# Patient Record
Sex: FEMALE | Race: WHITE | Employment: FULL TIME | URBAN - METROPOLITAN AREA
[De-identification: names, ages, dates, MRNs, and addresses within clinical notes are randomized per-mention and may not be internally consistent; named-entity substitution may affect disease eponyms.]

---

## 2019-04-30 ENCOUNTER — APPOINTMENT (OUTPATIENT)
Dept: GENERAL RADIOLOGY | Age: 49
DRG: 270 | End: 2019-04-30
Attending: INTERNAL MEDICINE
Payer: COMMERCIAL

## 2019-04-30 ENCOUNTER — APPOINTMENT (OUTPATIENT)
Dept: GENERAL RADIOLOGY | Age: 49
DRG: 270 | End: 2019-04-30
Payer: COMMERCIAL

## 2019-04-30 ENCOUNTER — HOSPITAL ENCOUNTER (INPATIENT)
Dept: CARDIAC CATH/INVASIVE PROCEDURES | Age: 49
LOS: 3 days | Discharge: HOME | DRG: 270 | End: 2019-05-03
Attending: INTERNAL MEDICINE | Admitting: INTERNAL MEDICINE
Payer: COMMERCIAL

## 2019-04-30 DIAGNOSIS — D64.9 ANEMIA, UNSPECIFIED TYPE: Primary | ICD-10-CM

## 2019-04-30 DIAGNOSIS — I21.19 ACUTE INFERIOR MYOCARDIAL INFARCTION (HCC): ICD-10-CM

## 2019-04-30 LAB
HCT VFR BLD CALC: 31.8 % (ref 36–48)
HEMOGLOBIN: 10.4 G/DL (ref 12–16)
LEFT VENTRICULAR EJECTION FRACTION MODE: NORMAL
LV EF: 50 %
MCH RBC QN AUTO: 29.1 PG (ref 26–34)
MCHC RBC AUTO-ENTMCNC: 32.8 G/DL (ref 31–36)
MCV RBC AUTO: 88.6 FL (ref 80–100)
PDW BLD-RTO: 13 % (ref 12.4–15.4)
PLATELET # BLD: 243 K/UL (ref 135–450)
PMV BLD AUTO: 8.6 FL (ref 5–10.5)
RBC # BLD: 3.59 M/UL (ref 4–5.2)
WBC # BLD: 16.2 K/UL (ref 4–11)

## 2019-04-30 PROCEDURE — 99152 MOD SED SAME PHYS/QHP 5/>YRS: CPT

## 2019-04-30 PROCEDURE — 2580000003 HC RX 258

## 2019-04-30 PROCEDURE — 93005 ELECTROCARDIOGRAM TRACING: CPT | Performed by: INTERNAL MEDICINE

## 2019-04-30 PROCEDURE — 6360000002 HC RX W HCPCS: Performed by: INTERNAL MEDICINE

## 2019-04-30 PROCEDURE — 92928 PRQ TCAT PLMT NTRAC ST 1 LES: CPT

## 2019-04-30 PROCEDURE — C1874 STENT, COATED/COV W/DEL SYS: HCPCS

## 2019-04-30 PROCEDURE — 85347 COAGULATION TIME ACTIVATED: CPT

## 2019-04-30 PROCEDURE — C1725 CATH, TRANSLUMIN NON-LASER: HCPCS

## 2019-04-30 PROCEDURE — 2580000003 HC RX 258: Performed by: INTERNAL MEDICINE

## 2019-04-30 PROCEDURE — 2500000003 HC RX 250 WO HCPCS

## 2019-04-30 PROCEDURE — C1887 CATHETER, GUIDING: HCPCS

## 2019-04-30 PROCEDURE — 92941 PRQ TRLML REVSC TOT OCCL AMI: CPT | Performed by: INTERNAL MEDICINE

## 2019-04-30 PROCEDURE — C1769 GUIDE WIRE: HCPCS

## 2019-04-30 PROCEDURE — B2111ZZ FLUOROSCOPY OF MULTIPLE CORONARY ARTERIES USING LOW OSMOLAR CONTRAST: ICD-10-PCS | Performed by: INTERNAL MEDICINE

## 2019-04-30 PROCEDURE — 94762 N-INVAS EAR/PLS OXIMTRY CONT: CPT

## 2019-04-30 PROCEDURE — 027136Z DILATION OF CORONARY ARTERY, TWO ARTERIES WITH THREE DRUG-ELUTING INTRALUMINAL DEVICES, PERCUTANEOUS APPROACH: ICD-10-PCS | Performed by: INTERNAL MEDICINE

## 2019-04-30 PROCEDURE — 33967 INSERT I-AORT PERCUT DEVICE: CPT

## 2019-04-30 PROCEDURE — 6370000000 HC RX 637 (ALT 250 FOR IP)

## 2019-04-30 PROCEDURE — C1894 INTRO/SHEATH, NON-LASER: HCPCS

## 2019-04-30 PROCEDURE — 71045 X-RAY EXAM CHEST 1 VIEW: CPT

## 2019-04-30 PROCEDURE — 5A02210 ASSISTANCE WITH CARDIAC OUTPUT USING BALLOON PUMP, CONTINUOUS: ICD-10-PCS | Performed by: INTERNAL MEDICINE

## 2019-04-30 PROCEDURE — 99153 MOD SED SAME PHYS/QHP EA: CPT

## 2019-04-30 PROCEDURE — 2100000000 HC CCU R&B

## 2019-04-30 PROCEDURE — 99223 1ST HOSP IP/OBS HIGH 75: CPT | Performed by: INTERNAL MEDICINE

## 2019-04-30 PROCEDURE — 6360000004 HC RX CONTRAST MEDICATION: Performed by: INTERNAL MEDICINE

## 2019-04-30 PROCEDURE — 93458 L HRT ARTERY/VENTRICLE ANGIO: CPT

## 2019-04-30 PROCEDURE — 2709999900 HC NON-CHARGEABLE SUPPLY

## 2019-04-30 PROCEDURE — 92928 PRQ TCAT PLMT NTRAC ST 1 LES: CPT | Performed by: INTERNAL MEDICINE

## 2019-04-30 PROCEDURE — 6360000002 HC RX W HCPCS

## 2019-04-30 PROCEDURE — 85027 COMPLETE CBC AUTOMATED: CPT

## 2019-04-30 PROCEDURE — 2700000000 HC OXYGEN THERAPY PER DAY

## 2019-04-30 PROCEDURE — 36415 COLL VENOUS BLD VENIPUNCTURE: CPT

## 2019-04-30 PROCEDURE — C9113 INJ PANTOPRAZOLE SODIUM, VIA: HCPCS | Performed by: INTERNAL MEDICINE

## 2019-04-30 PROCEDURE — 2780000010 HC IMPLANT OTHER

## 2019-04-30 RX ORDER — ATROPINE SULFATE 0.4 MG/ML
0.5 AMPUL (ML) INJECTION
Status: ACTIVE | OUTPATIENT
Start: 2019-04-30 | End: 2019-04-30

## 2019-04-30 RX ORDER — SODIUM CHLORIDE 9 MG/ML
INJECTION, SOLUTION INTRAVENOUS CONTINUOUS
Status: DISCONTINUED | OUTPATIENT
Start: 2019-04-30 | End: 2019-05-03 | Stop reason: HOSPADM

## 2019-04-30 RX ORDER — PROMETHAZINE HYDROCHLORIDE 25 MG/ML
12.5 INJECTION, SOLUTION INTRAMUSCULAR; INTRAVENOUS ONCE
Status: DISCONTINUED | OUTPATIENT
Start: 2019-04-30 | End: 2019-04-30

## 2019-04-30 RX ORDER — LORAZEPAM 2 MG/ML
INJECTION INTRAMUSCULAR
Status: DISPENSED
Start: 2019-04-30 | End: 2019-05-01

## 2019-04-30 RX ORDER — ACETAMINOPHEN 325 MG/1
650 TABLET ORAL EVERY 4 HOURS PRN
Status: DISCONTINUED | OUTPATIENT
Start: 2019-04-30 | End: 2019-05-03 | Stop reason: HOSPADM

## 2019-04-30 RX ORDER — ASPIRIN 81 MG/1
81 TABLET ORAL DAILY
Status: DISCONTINUED | OUTPATIENT
Start: 2019-05-01 | End: 2019-05-03 | Stop reason: HOSPADM

## 2019-04-30 RX ORDER — DOPAMINE HYDROCHLORIDE 160 MG/100ML
5 INJECTION, SOLUTION INTRAVENOUS CONTINUOUS
Status: DISCONTINUED | OUTPATIENT
Start: 2019-04-30 | End: 2019-05-03 | Stop reason: HOSPADM

## 2019-04-30 RX ORDER — CLOBETASOL PROPIONATE 0.5 MG/G
CREAM TOPICAL 2 TIMES DAILY
COMMUNITY
End: 2019-05-17 | Stop reason: CLARIF

## 2019-04-30 RX ORDER — BISACODYL 10 MG
10 SUPPOSITORY, RECTAL RECTAL DAILY PRN
Status: DISCONTINUED | OUTPATIENT
Start: 2019-04-30 | End: 2019-05-03 | Stop reason: HOSPADM

## 2019-04-30 RX ORDER — DIPHENHYDRAMINE HYDROCHLORIDE 50 MG/ML
25 INJECTION INTRAMUSCULAR; INTRAVENOUS ONCE
Status: COMPLETED | OUTPATIENT
Start: 2019-04-30 | End: 2019-04-30

## 2019-04-30 RX ORDER — PANTOPRAZOLE SODIUM 40 MG/10ML
40 INJECTION, POWDER, LYOPHILIZED, FOR SOLUTION INTRAVENOUS DAILY
Status: DISCONTINUED | OUTPATIENT
Start: 2019-04-30 | End: 2019-05-03 | Stop reason: HOSPADM

## 2019-04-30 RX ORDER — 0.9 % SODIUM CHLORIDE 0.9 %
10 VIAL (ML) INJECTION DAILY
Status: DISCONTINUED | OUTPATIENT
Start: 2019-04-30 | End: 2019-05-03 | Stop reason: HOSPADM

## 2019-04-30 RX ORDER — 0.9 % SODIUM CHLORIDE 0.9 %
250 INTRAVENOUS SOLUTION INTRAVENOUS PRN
Status: DISCONTINUED | OUTPATIENT
Start: 2019-04-30 | End: 2019-05-03 | Stop reason: HOSPADM

## 2019-04-30 RX ORDER — LIDOCAINE HYDROCHLORIDE 10 MG/ML
5 INJECTION, SOLUTION EPIDURAL; INFILTRATION; INTRACAUDAL; PERINEURAL ONCE
Status: DISCONTINUED | OUTPATIENT
Start: 2019-04-30 | End: 2019-05-03 | Stop reason: HOSPADM

## 2019-04-30 RX ORDER — DIPHENHYDRAMINE HYDROCHLORIDE 50 MG/ML
25 INJECTION INTRAMUSCULAR; INTRAVENOUS EVERY 6 HOURS PRN
Status: DISCONTINUED | OUTPATIENT
Start: 2019-04-30 | End: 2019-05-03 | Stop reason: HOSPADM

## 2019-04-30 RX ORDER — ONDANSETRON 2 MG/ML
4 INJECTION INTRAMUSCULAR; INTRAVENOUS EVERY 6 HOURS PRN
Status: DISCONTINUED | OUTPATIENT
Start: 2019-04-30 | End: 2019-05-03 | Stop reason: HOSPADM

## 2019-04-30 RX ORDER — MORPHINE SULFATE 2 MG/ML
2 INJECTION, SOLUTION INTRAMUSCULAR; INTRAVENOUS
Status: ACTIVE | OUTPATIENT
Start: 2019-04-30 | End: 2019-04-30

## 2019-04-30 RX ORDER — SODIUM CHLORIDE 0.9 % (FLUSH) 0.9 %
10 SYRINGE (ML) INJECTION EVERY 12 HOURS SCHEDULED
Status: DISCONTINUED | OUTPATIENT
Start: 2019-04-30 | End: 2019-05-03 | Stop reason: HOSPADM

## 2019-04-30 RX ORDER — ATORVASTATIN CALCIUM 40 MG/1
40 TABLET, FILM COATED ORAL NIGHTLY
Status: DISCONTINUED | OUTPATIENT
Start: 2019-04-30 | End: 2019-05-03 | Stop reason: HOSPADM

## 2019-04-30 RX ORDER — SODIUM CHLORIDE 0.9 % (FLUSH) 0.9 %
10 SYRINGE (ML) INJECTION EVERY 12 HOURS SCHEDULED
Status: DISCONTINUED | OUTPATIENT
Start: 2019-04-30 | End: 2019-04-30

## 2019-04-30 RX ORDER — LISINOPRIL 10 MG/1
10 TABLET ORAL DAILY
Status: ON HOLD | COMMUNITY
End: 2019-05-01 | Stop reason: ALTCHOICE

## 2019-04-30 RX ORDER — SODIUM CHLORIDE 0.9 % (FLUSH) 0.9 %
10 SYRINGE (ML) INJECTION PRN
Status: DISCONTINUED | OUTPATIENT
Start: 2019-04-30 | End: 2019-04-30

## 2019-04-30 RX ORDER — LORAZEPAM 2 MG/ML
0.5 INJECTION INTRAMUSCULAR ONCE
Status: COMPLETED | OUTPATIENT
Start: 2019-04-30 | End: 2019-04-30

## 2019-04-30 RX ORDER — PAROXETINE 10 MG/1
10 TABLET, FILM COATED ORAL EVERY MORNING
Status: ON HOLD | COMMUNITY
End: 2019-05-01

## 2019-04-30 RX ORDER — SODIUM CHLORIDE 0.9 % (FLUSH) 0.9 %
10 SYRINGE (ML) INJECTION PRN
Status: DISCONTINUED | OUTPATIENT
Start: 2019-04-30 | End: 2019-05-03 | Stop reason: HOSPADM

## 2019-04-30 RX ADMIN — DIPHENHYDRAMINE HYDROCHLORIDE 25 MG: 50 INJECTION, SOLUTION INTRAMUSCULAR; INTRAVENOUS at 16:28

## 2019-04-30 RX ADMIN — LORAZEPAM 0.5 MG: 2 INJECTION INTRAMUSCULAR; INTRAVENOUS at 19:19

## 2019-04-30 RX ADMIN — Medication 10 ML: at 16:28

## 2019-04-30 RX ADMIN — PANTOPRAZOLE SODIUM 40 MG: 40 INJECTION, POWDER, FOR SOLUTION INTRAVENOUS at 16:28

## 2019-04-30 RX ADMIN — ONDANSETRON 4 MG: 2 INJECTION INTRAMUSCULAR; INTRAVENOUS at 18:11

## 2019-04-30 RX ADMIN — PROMETHAZINE HYDROCHLORIDE 12.5 MG: 25 INJECTION INTRAMUSCULAR; INTRAVENOUS at 14:25

## 2019-04-30 RX ADMIN — TIROFIBAN 0.15 MCG/KG/MIN: 5 INJECTION, SOLUTION INTRAVENOUS at 23:58

## 2019-04-30 RX ADMIN — IOPAMIDOL 140 ML: 755 INJECTION, SOLUTION INTRAVENOUS at 13:24

## 2019-04-30 RX ADMIN — DIPHENHYDRAMINE HYDROCHLORIDE 25 MG: 50 INJECTION, SOLUTION INTRAMUSCULAR; INTRAVENOUS at 23:25

## 2019-04-30 SDOH — HEALTH STABILITY: MENTAL HEALTH: HOW OFTEN DO YOU HAVE A DRINK CONTAINING ALCOHOL?: MONTHLY OR LESS

## 2019-04-30 ASSESSMENT — PAIN SCALES - GENERAL
PAINLEVEL_OUTOF10: 0

## 2019-04-30 NOTE — PROGRESS NOTES
1910 Shift report received from [facility]. Patient resting on bed in RT position. Drowsy but awakens easily. IABP functioning appropriately. Handoff completed on Dopamine & Aggrastat gtt's. MAP currently >goal. Parry draining clear urine. New orders placed; hold Brilinta tonight d/t nausea, keep Aggrastat running overnight per Dr Asha Gilmore. 1915 STAT EKG performed. 2000 Shift assessment completed. VSS, afebrile, MAP >goal.    2055 MAP well above goal, dopamine titrated down per MAR.    2200 Patient chest pain & nausea free at this time. VSS stable on dopamine. IABP functioning appropriately. 0000 Reassessment completed. VSS, afebrile, MAP >goal. Patient having lower back pain (chronic) and unable to get in a comfortable despite trying multiple positions using turning wedge & pillows. Uses heat pad at home. Cardiology page for orders. [facility] re-paged. 56 Spoke with Dr Jeanine Bernal; ok to apply heat therapy to lower back. 0115 Heat providing relief to back pain. Pt feeling nauseous; phenergan IVPB initiated. 0200 Nausea settled. Patient resting quietly. 0300 Patient states she is having an anxiety attack. When questioned about what writer can do to help her patient states \"nothing, I just need to relax\". When asked if she tales anything for anxiety at home the patient said \"no\". 0400 Shift assessment completed. VSS, afebrile, MAP >goal. IABP functioning appropriately. 0500 Lab at bedside to collect morning blood work.    0600 Critical value; troponin 1.02. Call to Cardiology deferred as result anticipated due to yesterdays cardiac event.    0705 Shift report given to [facility].

## 2019-04-30 NOTE — PROCEDURES
coronary wire and the lesion ballooned with a 2.5-mm  balloon. This did result in DALTON 3 flow in the vessel. The patient was  given IV unfractionated heparin. ACTs were checked throughout the case  to maintain an ACT greater than 250 seconds. She was also started on IV  tirofiban for antiplatelet activity. The patient became unstable at this point with hypotension. She was  given 1 mg of IV epinephrine as her pressure was in the 40s at one  point. She did have some short runs of VT with this and her systolic  pressure augmented greatly. Eventually, her pressure settled down and  she was placed on an IV dopamine drip at 10 mcg/kg/minute. The RCA lesion was then stented with a 3 mm x 23-mm [place] drug-eluting  stent dilated to 3.10 mm. There was residual stenosis off the distal  end so this was then stented with a 2.75 x 12-mm [place] drug-eluting  stent dilated to a normal diameter. The overlap between the stents was  dilated. The balloon was then removed and final angiography was  performed with the wire down and removed. The guide catheter was  removed over the J-wire. Next, the pigtail catheter was advanced over  the wire into the left ventricle. Left ventricular end-diastolic  pressure measurements were obtained and then a power injection left  ventriculogram was performed. Catheter was flushed and placed back on  pressure and then pulled back across the aortic valve to assess for  gradient. Catheter was removed over the wire. Next, my attention was  turned towards performing an intervention on the 99% proximal circumflex  lesion. Over the 0.035 J-wire, the XB3.5, 6-[place]ian guide catheter was advanced  to the ostium of the left main coronary artery. The 99% circumflex  lesion was crossed with a Whisper 0.014 coronary wire. The proximal RCA  was then ballooned with a 2.5-mm balloon. This was then stented with a  2.75 x 12-mm [place] drug-eluting stent.   This was dilated to a normal  diameter and repeat angiography was performed after removal of the  balloon. There was DALTON 3 flow in the vessel and 0% residual stenosis. The wire was removed and final angiography was performed. Guide  catheter was removed over the wire. The dopamine was titrated down to approximately 5 mcg/kg/minute. Angiography of the access site in the right femoral artery was performed  through the sheath port. The patient did become somewhat unstable with  regard to her blood pressure despite the lower dose of dopamine and IV  fluids with a systolic pressure in the 21N. The determination was made  to place an intraaortic balloon pump. The 6-Burmese sheath was exchanged out for the balloon pump introducer  sheath. The balloon pump was then introduced over the 0.025 wire into  the descending aorta just distal to the left subclavian artery. The  balloon pump system was flushed and then placed on pressure. It was  then turned on. At 1:1 counterpulsation, this demonstrated good  augmentation of diastolic pressure and the mean arterial pressure. Sterile dressing was applied and the patient was taken off the table. There were no complications from the procedure and there was minimal  blood loss. Moderate sedation was performed for the procedure. Total  duration of sedation was 86 minutes. The patient received a total of 2  mg of IV Versed and no fentanyl. She received Zofran and Viktor-Synephrine  along with one dose of epinephrine and a dopamine drip. FINDINGS:  1. Right-dominant coronary arterial circulation with 100% occlusion of  the proximal RCA. This was covered with overlapping stents. The more  proximal stent was a 3 mm x 23-mm [place] drug-eluting stent dilated to  3.1. Distal to this and overlapping was a 2.75 x 12-mm [place]  drug-eluting stent. In the left main, there was no significant disease.   The circumflex had a 99% proximal lesion that was intervened upon with a  2.75 x 12-mm [address] drug-eluting stent dilated to 2.75 mm. In the left  anterior descending artery, there is a focal 60% lesion present with no  flow limitation. 2.  Preserved left ventricular systolic function. LV ejection fraction  50% but basal to mid-inferior wall hypokinesis. 3.  Left ventricular end-diastolic pressure 11 to 13 mmHg. 4.  No gradient across the aortic valve on pullback to suggest aortic  stenosis.         Hilario Lemons MD    D: 04/30/2019 13:22:22       T: 04/30/2019 13:27:08     [email or web address]  Job#: [id number]     Doc#: [id number]    CC:

## 2019-04-30 NOTE — H&P
[facility]  1970 April 30, 2019      CC: Chest Pain    HPI:  The patient is 46 y.o. female with a past medical history significant for prior tobacco abuse who presented to [facility] with chest pain that had been occurring for about a week off an on. She had presented several days ago to an ED in [place], [place] and had a chest CTA demonstrating no PE or aneurysm/dissection. Today, her chest pain came on and did not subside. She had tingling and numbness into her left arm. ON presetaton to [facility] she had T wave inversion inferolaterally and elevated troponin assays consistent with a NSTEMI. Given that she had persistent chest pain, She was flown urgently to [facility] for coronary angiography. On arrival, she rates her chest pain a 7 out of 10 in severity,. Review of Systems:  Constitutional: No fatigue, weakness, night sweats or fever. HEENT: No new vision difficulties or ringing in the ears. Respiratory: No new SOB, PND, orthopnea or cough. Cardiovascular: See HPI   GI: No n/v, diarrhea, constipation, abdominal pain or changes in bowel habits. No melena, no hematochezia  : No urinary frequency, urgency, incontinence, hematuria or dysuria. Skin: No cyanosis or skin lesions. Musculoskeletal: No new muscle or joint pain. Neurological: No syncope or TIA-like symptoms.   Psychiatric: No anxiety, insomnia or depression     Past medical history:  Hypertension  Hyperlipidemia  Depression    Family history:  Reviewed and noncontributory    Social History     Tobacco Use    Smoking status: Former   Substance Use Topics    Alcohol use: Denies excessive    Drug use: Denies excessive       Allergies:  Penicillin    Medications:  Reviewed:  Lisinopril  Atorvastatin  Paroxetine      Physical Exam:   119/73  74  14  95% on 2 liters by NC  Wt Readings from Last 2 Encounters:   No data found for Wt     Constitutional: She is oriented to person,

## 2019-04-30 NOTE — ANESTHESIA PRE-OP
Brief Pre-Op Note/Sedation Assessment      Luh Mcfadden  1970  Room/bed info not found  [id number]  11:39 AM    Planned Procedure: Cardiac Catheterization Procedure    Post Procedure Plan: Return to same level of care    Consent: I have discussed with the patient and/or the patient representative the indication, alternatives, and the possible risks and/or complications of the planned procedure and the anesthesia methods. The patient and/or patient representative appear to understand and agree to proceed. Chief Complaint: NSTEMI      Indications for the Procedure:   CAD Presentation:  ACS > 24 hrs  Anginal Classification within 2 weeks:  CCS IV - Inability to perform any activity without angina or angina at rest, i.e., severe limitation  NYHA Heart Failure Class within 2 weeks: No symptoms      Anti- Anginal Meds within 2 weeks:   ANTI-ANGINAL MEDS: Yes: Aspirin      Stress or Imaging Studies Performed:  None    Vital Signs: There were no vitals taken for this visit. Allergies: Allergies not on file    Past Medical History:  No past medical history on file. Surgical History:  No past surgical history on file. Medications:  No current outpatient medications on file. No current facility-administered medications for this encounter. Pre-Sedation:    Pre-Sedation Documentation and Exam:  I have personally completed a history, physical exam & review of systems for this patient (see notes). Prior History of Anesthesia Complications:   none    Modified Mallampati:  I (soft palate, uvula, fauces, tonsillar pillars visible)    ASA Classification:  Class 2 - A normal healthy patient with mild systemic disease and Class 2 -- A normal healthy patient with mild systemic disease    Bret Scale:   Activity:  2 - Able to move 4 extremities voluntarily on command  Respiration:  2 - Able to breathe deeply and cough freely  Circulation:  2 - BP+/- 20mmHg of normal  Consciousness:  2 - Fully awake  Oxygen Saturation (color):  2 - Able to maintain oxygen saturation >92% on room air    Sedation/Anesthesia Plan:  Guard the patient's safety and welfare. Minimize physical discomfort and pain. Minimize negative psychological responses to treatment by providing sedation and analgesia and maximize the potential amnesia. Patient to meet pre-procedure discharge plan.     Medication Planned:  midazolam intravenously, fentanyl intravenously and midazolam intravenously, fentanyl intravenously    Patient is an appropriate candidate for plan of sedation: yes      Electronically signed by Parul Shelton MD on 4/30/2019 at 11:39 AM

## 2019-04-30 NOTE — PROGRESS NOTES
line in AM. Will obtain EKG, perhaps consult GI in AM if nausea does not subside. Discussed continued need for dopamine and inability to wean further. Patient and  updated. 2230- Report given to Camille Qureshi RN. Bedside handoff completed.

## 2019-05-01 ENCOUNTER — APPOINTMENT (OUTPATIENT)
Dept: GENERAL RADIOLOGY | Age: 49
DRG: 270 | End: 2019-05-01
Attending: INTERNAL MEDICINE
Payer: COMMERCIAL

## 2019-05-01 LAB
A/G RATIO: 1.1 (ref 1.1–2.2)
ALBUMIN SERPL-MCNC: 2.7 G/DL (ref 3.4–5)
ALP BLD-CCNC: 97 U/L (ref 40–129)
ALT SERPL-CCNC: 12 U/L (ref 10–40)
ANION GAP SERPL CALCULATED.3IONS-SCNC: 9 MMOL/L (ref 3–16)
AST SERPL-CCNC: 56 U/L (ref 15–37)
BILIRUB SERPL-MCNC: <0.2 MG/DL (ref 0–1)
BUN BLDV-MCNC: 12 MG/DL (ref 7–20)
CALCIUM SERPL-MCNC: 7.3 MG/DL (ref 8.3–10.6)
CHLORIDE BLD-SCNC: 109 MMOL/L (ref 99–110)
CHOLESTEROL, TOTAL: 157 MG/DL (ref 0–199)
CO2: 21 MMOL/L (ref 21–32)
CREAT SERPL-MCNC: 0.9 MG/DL (ref 0.6–1.1)
EKG ATRIAL RATE: 69 BPM
EKG ATRIAL RATE: 72 BPM
EKG DIAGNOSIS: NORMAL
EKG DIAGNOSIS: NORMAL
EKG P AXIS: 70 DEGREES
EKG P AXIS: 76 DEGREES
EKG P-R INTERVAL: 164 MS
EKG P-R INTERVAL: 168 MS
EKG Q-T INTERVAL: 420 MS
EKG Q-T INTERVAL: 462 MS
EKG QRS DURATION: 104 MS
EKG QRS DURATION: 78 MS
EKG QTC CALCULATION (BAZETT): 459 MS
EKG QTC CALCULATION (BAZETT): 495 MS
EKG R AXIS: -7 DEGREES
EKG R AXIS: 6 DEGREES
EKG T AXIS: -34 DEGREES
EKG T AXIS: 4 DEGREES
EKG VENTRICULAR RATE: 69 BPM
EKG VENTRICULAR RATE: 72 BPM
GFR AFRICAN AMERICAN: >60
GFR NON-AFRICAN AMERICAN: >60
GLOBULIN: 2.5 G/DL
GLUCOSE BLD-MCNC: 112 MG/DL (ref 70–99)
HCT VFR BLD CALC: 26.7 % (ref 36–48)
HCT VFR BLD CALC: 29.2 % (ref 36–48)
HDLC SERPL-MCNC: 57 MG/DL (ref 40–60)
HEMOGLOBIN: 9 G/DL (ref 12–16)
HEMOGLOBIN: 9.7 G/DL (ref 12–16)
LDL CHOLESTEROL CALCULATED: 68 MG/DL
LV EF: 63 %
LVEF MODALITY: NORMAL
MCH RBC QN AUTO: 29 PG (ref 26–34)
MCH RBC QN AUTO: 29.2 PG (ref 26–34)
MCHC RBC AUTO-ENTMCNC: 33 G/DL (ref 31–36)
MCHC RBC AUTO-ENTMCNC: 33.5 G/DL (ref 31–36)
MCV RBC AUTO: 87.2 FL (ref 80–100)
MCV RBC AUTO: 87.9 FL (ref 80–100)
PDW BLD-RTO: 12.9 % (ref 12.4–15.4)
PDW BLD-RTO: 13.1 % (ref 12.4–15.4)
PLATELET # BLD: 205 K/UL (ref 135–450)
PLATELET # BLD: 221 K/UL (ref 135–450)
PLATELET # BLD: 229 K/UL (ref 135–450)
PMV BLD AUTO: 8.3 FL (ref 5–10.5)
PMV BLD AUTO: 8.4 FL (ref 5–10.5)
POTASSIUM SERPL-SCNC: 3.5 MMOL/L (ref 3.5–5.1)
RBC # BLD: 3.07 M/UL (ref 4–5.2)
RBC # BLD: 3.33 M/UL (ref 4–5.2)
SODIUM BLD-SCNC: 139 MMOL/L (ref 136–145)
TOTAL PROTEIN: 5.2 G/DL (ref 6.4–8.2)
TRIGL SERPL-MCNC: 158 MG/DL (ref 0–150)
TROPONIN: 1.02 NG/ML
VLDLC SERPL CALC-MCNC: 32 MG/DL
WBC # BLD: 11.2 K/UL (ref 4–11)
WBC # BLD: 13.1 K/UL (ref 4–11)

## 2019-05-01 PROCEDURE — 99233 SBSQ HOSP IP/OBS HIGH 50: CPT | Performed by: INTERNAL MEDICINE

## 2019-05-01 PROCEDURE — 85027 COMPLETE CBC AUTOMATED: CPT

## 2019-05-01 PROCEDURE — 93010 ELECTROCARDIOGRAM REPORT: CPT | Performed by: INTERNAL MEDICINE

## 2019-05-01 PROCEDURE — 71045 X-RAY EXAM CHEST 1 VIEW: CPT

## 2019-05-01 PROCEDURE — 94761 N-INVAS EAR/PLS OXIMETRY MLT: CPT

## 2019-05-01 PROCEDURE — 33968 REMOVE AORTIC ASSIST DEVICE: CPT | Performed by: INTERNAL MEDICINE

## 2019-05-01 PROCEDURE — 6370000000 HC RX 637 (ALT 250 FOR IP): Performed by: INTERNAL MEDICINE

## 2019-05-01 PROCEDURE — 80053 COMPREHEN METABOLIC PANEL: CPT

## 2019-05-01 PROCEDURE — C1751 CATH, INF, PER/CENT/MIDLINE: HCPCS

## 2019-05-01 PROCEDURE — 36415 COLL VENOUS BLD VENIPUNCTURE: CPT

## 2019-05-01 PROCEDURE — 6360000002 HC RX W HCPCS: Performed by: INTERNAL MEDICINE

## 2019-05-01 PROCEDURE — 36569 INSJ PICC 5 YR+ W/O IMAGING: CPT

## 2019-05-01 PROCEDURE — 76937 US GUIDE VASCULAR ACCESS: CPT

## 2019-05-01 PROCEDURE — 02HV33Z INSERTION OF INFUSION DEVICE INTO SUPERIOR VENA CAVA, PERCUTANEOUS APPROACH: ICD-10-PCS | Performed by: RADIOLOGY

## 2019-05-01 PROCEDURE — 84484 ASSAY OF TROPONIN QUANT: CPT

## 2019-05-01 PROCEDURE — 85049 AUTOMATED PLATELET COUNT: CPT

## 2019-05-01 PROCEDURE — 2580000003 HC RX 258: Performed by: INTERNAL MEDICINE

## 2019-05-01 PROCEDURE — C9113 INJ PANTOPRAZOLE SODIUM, VIA: HCPCS | Performed by: INTERNAL MEDICINE

## 2019-05-01 PROCEDURE — 6360000002 HC RX W HCPCS

## 2019-05-01 PROCEDURE — 93306 TTE W/DOPPLER COMPLETE: CPT

## 2019-05-01 PROCEDURE — 2100000000 HC CCU R&B

## 2019-05-01 PROCEDURE — 80061 LIPID PANEL: CPT

## 2019-05-01 RX ORDER — ATORVASTATIN CALCIUM 20 MG/1
20 TABLET, FILM COATED ORAL DAILY
Status: ON HOLD | COMMUNITY
End: 2019-05-03 | Stop reason: HOSPADM

## 2019-05-01 RX ORDER — METOPROLOL SUCCINATE 25 MG/1
12.5 TABLET, EXTENDED RELEASE ORAL EVERY EVENING
Status: DISCONTINUED | OUTPATIENT
Start: 2019-05-01 | End: 2019-05-03 | Stop reason: HOSPADM

## 2019-05-01 RX ORDER — ASPIRIN 325 MG
325 TABLET ORAL DAILY
Status: ON HOLD | COMMUNITY
End: 2019-05-03 | Stop reason: HOSPADM

## 2019-05-01 RX ORDER — ATROPINE SULFATE 0.1 MG/ML
INJECTION INTRAVENOUS
Status: DISCONTINUED
Start: 2019-05-01 | End: 2019-05-01 | Stop reason: WASHOUT

## 2019-05-01 RX ORDER — M-VIT,TX,IRON,MINS/CALC/FOLIC 27MG-0.4MG
1 TABLET ORAL DAILY
Status: ON HOLD | COMMUNITY
End: 2019-05-03 | Stop reason: HOSPADM

## 2019-05-01 RX ORDER — FENTANYL CITRATE 50 UG/ML
25 INJECTION, SOLUTION INTRAMUSCULAR; INTRAVENOUS ONCE
Status: COMPLETED | OUTPATIENT
Start: 2019-05-01 | End: 2019-05-01

## 2019-05-01 RX ORDER — POTASSIUM CHLORIDE 29.8 MG/ML
20 INJECTION INTRAVENOUS ONCE
Status: COMPLETED | OUTPATIENT
Start: 2019-05-01 | End: 2019-05-01

## 2019-05-01 RX ADMIN — TICAGRELOR 180 MG: 90 TABLET ORAL at 09:40

## 2019-05-01 RX ADMIN — Medication 12.5 MG: at 01:18

## 2019-05-01 RX ADMIN — TICAGRELOR 90 MG: 90 TABLET ORAL at 20:56

## 2019-05-01 RX ADMIN — ASPIRIN 81 MG: 81 TABLET, COATED ORAL at 09:40

## 2019-05-01 RX ADMIN — FENTANYL CITRATE 25 MCG: 50 INJECTION, SOLUTION INTRAMUSCULAR; INTRAVENOUS at 11:23

## 2019-05-01 RX ADMIN — DOPAMINE HYDROCHLORIDE 3 MCG/KG/MIN: 160 INJECTION, SOLUTION INTRAVENOUS at 03:36

## 2019-05-01 RX ADMIN — ACETAMINOPHEN 650 MG: 325 TABLET ORAL at 07:45

## 2019-05-01 RX ADMIN — SODIUM CHLORIDE: 9 INJECTION, SOLUTION INTRAVENOUS at 04:53

## 2019-05-01 RX ADMIN — SODIUM CHLORIDE: 9 INJECTION, SOLUTION INTRAVENOUS at 18:29

## 2019-05-01 RX ADMIN — METOPROLOL SUCCINATE 12.5 MG: 25 TABLET, EXTENDED RELEASE ORAL at 17:33

## 2019-05-01 RX ADMIN — FENTANYL CITRATE 25 MCG: 50 INJECTION INTRAMUSCULAR; INTRAVENOUS at 11:40

## 2019-05-01 RX ADMIN — ACETAMINOPHEN 650 MG: 325 TABLET ORAL at 12:24

## 2019-05-01 RX ADMIN — ATORVASTATIN CALCIUM 40 MG: 40 TABLET, FILM COATED ORAL at 20:56

## 2019-05-01 RX ADMIN — Medication 10 ML: at 20:58

## 2019-05-01 RX ADMIN — PANTOPRAZOLE SODIUM 40 MG: 40 INJECTION, POWDER, FOR SOLUTION INTRAVENOUS at 09:40

## 2019-05-01 RX ADMIN — Medication 10 ML: at 09:41

## 2019-05-01 RX ADMIN — POTASSIUM CHLORIDE 20 MEQ: 29.8 INJECTION, SOLUTION INTRAVENOUS at 11:25

## 2019-05-01 ASSESSMENT — PAIN DESCRIPTION - PROGRESSION: CLINICAL_PROGRESSION: NOT CHANGED

## 2019-05-01 ASSESSMENT — PAIN DESCRIPTION - LOCATION
LOCATION: BACK

## 2019-05-01 ASSESSMENT — PAIN DESCRIPTION - PAIN TYPE
TYPE: CHRONIC PAIN

## 2019-05-01 ASSESSMENT — PAIN DESCRIPTION - FREQUENCY
FREQUENCY: CONTINUOUS
FREQUENCY: CONTINUOUS

## 2019-05-01 ASSESSMENT — PAIN DESCRIPTION - ORIENTATION
ORIENTATION: LOWER

## 2019-05-01 ASSESSMENT — PAIN SCALES - GENERAL
PAINLEVEL_OUTOF10: 6
PAINLEVEL_OUTOF10: 6
PAINLEVEL_OUTOF10: 0
PAINLEVEL_OUTOF10: 4
PAINLEVEL_OUTOF10: 0
PAINLEVEL_OUTOF10: 4
PAINLEVEL_OUTOF10: 6
PAINLEVEL_OUTOF10: 2
PAINLEVEL_OUTOF10: 4
PAINLEVEL_OUTOF10: 5

## 2019-05-01 ASSESSMENT — PAIN DESCRIPTION - DESCRIPTORS
DESCRIPTORS: ACHING;DISCOMFORT
DESCRIPTORS: ACHING;DISCOMFORT

## 2019-05-01 ASSESSMENT — PAIN - FUNCTIONAL ASSESSMENT: PAIN_FUNCTIONAL_ASSESSMENT: ACTIVITIES ARE NOT PREVENTED

## 2019-05-01 ASSESSMENT — PAIN DESCRIPTION - ONSET: ONSET: GRADUAL

## 2019-05-01 NOTE — PROGRESS NOTES
[facility]   Daily Progress Note      Admit Date:  4/30/2019      Subjective:   Ms. Ewing is a 46 y.o. female with a past medical history significant for prior tobacco abuse who presented to [facility] with chest pain that had been occurring for about a week off an on. She had presented several days ago to an ED in [place], [place] and had a chest CTA demonstrating no PE or aneurysm/dissection. Today, her chest pain came on and did not subside. She had tingling and numbness into her left arm. ON presetaton to [facility] she had T wave inversion inferolaterally and elevated troponin assays consistent with a NSTEMI. Given that she had persistent chest pain, She was flown urgently to [facility] for coronary angiography. In the lab she underwent PCI with MELBA X2 to her 100% occluded proximal RCA restoring DALTON 3 flow. She also underwent MELBA placement to a 99% proximal Circumflex lesion resulting in 0% residual stenosis. Micheal Aguilar feels better today. She has no nausea or chest pain.  Brief runs of NSVT on telemetry.        Objective:     /82   Pulse 90   Temp 98.4 °F (36.9 °C) (Temporal)   Resp 22   Ht 5' 7\" (1.702 m)   Wt 169 lb 8.5 oz (76.9 kg)   SpO2 93%   BMI 26.55 kg/m²      Intake/Output Summary (Last 24 hours) at 5/1/2019 1159  Last data filed at 5/1/2019 1000  Gross per 24 hour   Intake 2966.75 ml   Output 1080 ml   Net 1886.75 ml       Physical Exam:  General:  Awake, alert, NAD  Skin:  Warm and dry  Neck:  JVD<8, no carotid bruits  Chest:  Clear to auscultation, no wheezes/rhonchi/rales  Cardiovascular:  RRR, normal S1/S2, no M/R/G  Abdomen:  Soft, nontender, +bowel sounds  Extremities:  No edema  Pulses: 2+ bilat carotid    2+ bilat radial    2+ bilat femoral        Medications:    atropine        sodium chloride flush  10 mL Intravenous 2 times per day    atorvastatin  40 mg Oral Nightly    aspirin  81 mg Oral Daily    ticagrelor  90 mg Oral BID    pantoprazole  40 mg Intravenous Daily    And    sodium chloride (PF)  10 mL Intravenous Daily    lidocaine 1 % injection  5 mL Intradermal Once      tirofiban 0.15 mcg/kg/min (04/30/19 9114)    DOPamine Stopped (05/01/19 0750)    sodium chloride 100 mL/hr at 05/01/19 1052       Lab Data:  CBC:   Recent Labs     04/30/19 2009 05/01/19  0507   WBC 16.2* 11.2*   HGB 10.4* 9.0*    229  221     BMP:    Recent Labs     05/01/19  0507      K 3.5   CO2 21   BUN 12   CREATININE 0.9     LIVR:   Recent Labs     05/01/19  0507   AST 56*   ALT 12     PT/INR:   Recent Labs     05/01/19  0507   PROT 5.2*     APTT: No results for input(s): APTT in the last 72 hours. BNP:  No results for input(s): BNP in the last 72 hours. CARDIAC ENZYMES:  Recent Labs     05/01/19 0507   TROPONINI 1.02*     FASTING LIPID PANEL:  Lab Results   Component Value Date    CHOL 157 05/01/2019    HDL 57 05/01/2019    TRIG 158 05/01/2019       Assessment:    Patient Active Problem List   Diagnosis    Acute inferior myocardial infarction [facility])    Cardiogenic shock (HCC)        Nausea/Vomiting    Plan:   Antonieta Hernandez is doing better today and now off vasopressors. I removed her IABP this morning. We will monitro her urine output to see if this improves. Holding beta blocker today as well as ACEI/ARB therapy until we are certain her BP will be stable. May be able to introduce low dose beta blockade this evening or tomorrow morning. Reloaded with Brilinta this am. Will stop Aggrastat now. Nausea has resolved. This may have post infarct related or vagal stimulation from IABP site.            Signed:  Radha Borjas MD

## 2019-05-01 NOTE — PROGRESS NOTES
PICC retracted 4cm per recommendation of Dr Angie Ge and repeat [facility] done. PICC now in low SVC per Dr Angie Ge. Nurse notified that PICC in proper placement and is OK to use.

## 2019-05-01 NOTE — CONSULTS
[address] [facility]  CARDIOPULMONARY PHASE I CONSULT        NAME:  Cindy Warren RECORD NUMBER:  [id number]  AGE: 46 y.o. GENDER: female  : 1970  TODAY'S DATE:  2019    Subjective:     VISIT TYPE: evaluation     ADMITTING PHYSICIAN:  Paco Phillips MD     PAST MEDICAL HISTORY        Diagnosis Date    CAD (coronary artery disease)     Hyperlipidemia        SOCIAL HISTORY    Social History     Tobacco Use    Smoking status: Former Smoker   Substance Use Topics    Alcohol use: Yes     Frequency: Monthly or less    Drug use: Never       ALLERGIES    Allergies   Allergen Reactions    Penicillins        MEDICATIONS  Scheduled Meds:   metoprolol succinate  12.5 mg Oral QPM    sodium chloride flush  10 mL Intravenous 2 times per day    atorvastatin  40 mg Oral Nightly    aspirin  81 mg Oral Daily    ticagrelor  90 mg Oral BID    pantoprazole  40 mg Intravenous Daily    And    sodium chloride (PF)  10 mL Intravenous Daily    lidocaine 1 % injection  5 mL Intradermal Once       ADMIT DATE: 2019      Objective:     ADMISSION DIAGNOSIS:   Acute inferior myocardial infarction (HCC) [I21.19]     /66   Pulse 107   Temp 98.4 °F (36.9 °C) (Temporal)   Resp 18   Ht 5' 7\" (1.702 m)   Wt 169 lb 8.5 oz (76.9 kg)   SpO2 99%   BMI 26.55 kg/m²     ADMIT:  Weight: 166 lb 0.1 oz (75.3 kg)    TODAY: Weight: 169 lb 8.5 oz (76.9 kg)    Wt Readings from Last 3 Encounters:   19 169 lb 8.5 oz (76.9 kg)        ECHOCARDIOGRAM: results pending     HgBA1c:  No components found for: HGBA1C  LIPID PANEL:    Lab Results   Component Value Date    CHOL 157 2019    HDL 57 2019    TRIG 158 2019        Assessment:     CONSULTS:   IP CONSULT TO CARDIAC REHAB    Patient has a CARDIOLOGY CONSULT: Yes        EDUCATION STATUS: Patient   [x]  Provided both written and verbal education on Cardiopulmonary Rehabilitation.   []  Provided instructions for smoking cessation

## 2019-05-01 NOTE — PLAN OF CARE
Problem: Falls - Risk of:  Goal: Will remain free from falls  Note:   Pt remains free of falls. Fall risk protocol in place. Bed locked in lowest position. Call light in reach. Pt instructed to call for assistance, verbalizes understanding. Will continue to monitor. Problem: Risk for Impaired Skin Integrity  Goal: Tissue integrity - skin and mucous membranes  Note:   Skin assessment complete, see flowsheet. Pt turned in bed q2h and as needed. Pt encouraged to change positions frequently. Pt checked for incontinence and jose care completed frequently. Moisture barrier/CARY wipes applied to jose area. Education given on importance of skin care, frequent turns, and moisture protection, pt verbalized understanding. Will continue to monitor. Problem: Pain - Acute:  Description  Pain management should include both nonpharmacologic and pharmacologic interventions. Goal: Pain level will decrease  Note:   Pt alert and oriented. Pt able to communicate present pain and use the pain scale appropriately. Nonpharmacological pain reducers and pain medication offered as needed. Will cont to monitor. Problem: Fluid Volume - Imbalance:  Goal: Absence of imbalanced fluid volume signs and symptoms  Note:   Patient with decreasing urine output. Discussed with Dr Genevieve Romero, IVF increased to 100 mL/hr. Problem: Cardiac Output - Decreased:  Goal: Cardiac output within specified parameters  Note:   Dopamine weaned off, IABP now providing 1:2 assist. BP stable, palpable pulses noted, skin warm and dry.

## 2019-05-02 LAB
BASOPHILS ABSOLUTE: 0.1 K/UL (ref 0–0.2)
BASOPHILS RELATIVE PERCENT: 0.7 %
EOSINOPHILS ABSOLUTE: 0.1 K/UL (ref 0–0.6)
EOSINOPHILS RELATIVE PERCENT: 1.1 %
HCT VFR BLD CALC: 26.2 % (ref 36–48)
HEMOGLOBIN: 8.9 G/DL (ref 12–16)
LYMPHOCYTES ABSOLUTE: 3.2 K/UL (ref 1–5.1)
LYMPHOCYTES RELATIVE PERCENT: 28.9 %
MCH RBC QN AUTO: 29.4 PG (ref 26–34)
MCHC RBC AUTO-ENTMCNC: 33.8 G/DL (ref 31–36)
MCV RBC AUTO: 86.7 FL (ref 80–100)
MONOCYTES ABSOLUTE: 0.8 K/UL (ref 0–1.3)
MONOCYTES RELATIVE PERCENT: 7.4 %
NEUTROPHILS ABSOLUTE: 6.9 K/UL (ref 1.7–7.7)
NEUTROPHILS RELATIVE PERCENT: 61.9 %
PDW BLD-RTO: 12.8 % (ref 12.4–15.4)
PLATELET # BLD: 195 K/UL (ref 135–450)
PMV BLD AUTO: 8.4 FL (ref 5–10.5)
POC ACT LR: 196 SEC
POC ACT LR: 341 SEC
RBC # BLD: 3.03 M/UL (ref 4–5.2)
WBC # BLD: 11.1 K/UL (ref 4–11)

## 2019-05-02 PROCEDURE — 36415 COLL VENOUS BLD VENIPUNCTURE: CPT

## 2019-05-02 PROCEDURE — 2060000000 HC ICU INTERMEDIATE R&B

## 2019-05-02 PROCEDURE — 94762 N-INVAS EAR/PLS OXIMTRY CONT: CPT

## 2019-05-02 PROCEDURE — 6360000002 HC RX W HCPCS: Performed by: INTERNAL MEDICINE

## 2019-05-02 PROCEDURE — C9113 INJ PANTOPRAZOLE SODIUM, VIA: HCPCS | Performed by: INTERNAL MEDICINE

## 2019-05-02 PROCEDURE — 94760 N-INVAS EAR/PLS OXIMETRY 1: CPT

## 2019-05-02 PROCEDURE — 2580000003 HC RX 258: Performed by: INTERNAL MEDICINE

## 2019-05-02 PROCEDURE — 85025 COMPLETE CBC W/AUTO DIFF WBC: CPT

## 2019-05-02 PROCEDURE — 6370000000 HC RX 637 (ALT 250 FOR IP): Performed by: INTERNAL MEDICINE

## 2019-05-02 PROCEDURE — 99232 SBSQ HOSP IP/OBS MODERATE 35: CPT | Performed by: INTERNAL MEDICINE

## 2019-05-02 RX ADMIN — ACETAMINOPHEN 650 MG: 325 TABLET ORAL at 04:20

## 2019-05-02 RX ADMIN — ATORVASTATIN CALCIUM 40 MG: 40 TABLET, FILM COATED ORAL at 21:15

## 2019-05-02 RX ADMIN — METOPROLOL SUCCINATE 12.5 MG: 25 TABLET, EXTENDED RELEASE ORAL at 16:55

## 2019-05-02 RX ADMIN — Medication 10 ML: at 11:12

## 2019-05-02 RX ADMIN — PANTOPRAZOLE SODIUM 40 MG: 40 INJECTION, POWDER, FOR SOLUTION INTRAVENOUS at 09:40

## 2019-05-02 RX ADMIN — ASPIRIN 81 MG: 81 TABLET, COATED ORAL at 09:39

## 2019-05-02 RX ADMIN — DIPHENHYDRAMINE HYDROCHLORIDE 25 MG: 50 INJECTION, SOLUTION INTRAMUSCULAR; INTRAVENOUS at 04:20

## 2019-05-02 RX ADMIN — TICAGRELOR 90 MG: 90 TABLET ORAL at 09:39

## 2019-05-02 RX ADMIN — TICAGRELOR 90 MG: 90 TABLET ORAL at 21:15

## 2019-05-02 RX ADMIN — SODIUM CHLORIDE: 9 INJECTION, SOLUTION INTRAVENOUS at 04:17

## 2019-05-02 RX ADMIN — Medication 10 ML: at 11:11

## 2019-05-02 RX ADMIN — ACETAMINOPHEN 650 MG: 325 TABLET ORAL at 16:54

## 2019-05-02 RX ADMIN — Medication 10 ML: at 21:20

## 2019-05-02 RX ADMIN — ACETAMINOPHEN 650 MG: 325 TABLET ORAL at 21:14

## 2019-05-02 ASSESSMENT — PAIN DESCRIPTION - FREQUENCY: FREQUENCY: INTERMITTENT

## 2019-05-02 ASSESSMENT — PAIN DESCRIPTION - DESCRIPTORS: DESCRIPTORS: HEADACHE

## 2019-05-02 ASSESSMENT — PAIN SCALES - GENERAL
PAINLEVEL_OUTOF10: 2
PAINLEVEL_OUTOF10: 0
PAINLEVEL_OUTOF10: 1
PAINLEVEL_OUTOF10: 0
PAINLEVEL_OUTOF10: 1
PAINLEVEL_OUTOF10: 1

## 2019-05-02 ASSESSMENT — PAIN DESCRIPTION - ORIENTATION: ORIENTATION: OTHER (COMMENT)

## 2019-05-02 ASSESSMENT — PAIN DESCRIPTION - PAIN TYPE: TYPE: CHRONIC PAIN

## 2019-05-02 ASSESSMENT — PAIN DESCRIPTION - LOCATION: LOCATION: HEAD

## 2019-05-02 ASSESSMENT — PAIN DESCRIPTION - PROGRESSION: CLINICAL_PROGRESSION: GRADUALLY IMPROVING

## 2019-05-02 ASSESSMENT — PAIN - FUNCTIONAL ASSESSMENT: PAIN_FUNCTIONAL_ASSESSMENT: ACTIVITIES ARE NOT PREVENTED

## 2019-05-02 ASSESSMENT — PAIN DESCRIPTION - ONSET: ONSET: UNABLE TO TELL

## 2019-05-02 NOTE — PROGRESS NOTES
1910 Shift report received from [facility]. Patient sitting up in chair eating evening meal, alert & oriented, no complaints of pain or discomfort. 1930 Shift assessment completed, no reportable findings. 2000 VSS, afebrile. Patient ambulates independently after set up to restroom and back to bed. Gait steady. 2145 Patient uses call light appropriately to use restroom again. 0000 Patient reassessed. VSS, afebrile, no complaints of pain. 0400 Patient reassessed. VSS, afebrile, no complaints of pain. 0600 Morning labs drawn & sent. 0710 Shift report given to Cash Wyman.

## 2019-05-02 NOTE — PROGRESS NOTES
D: Report called to PCU going to 0572. Patient and family aware. Belongings with her. L AC HL removed she has a R picc. Hep locked. Assessment charted.  [facility] informed she will go home tomorrow per Dr. Margarita Roldan

## 2019-05-02 NOTE — PROGRESS NOTES
Patient transferred safely to the room with all of her belongings. No issues. Call light within reach, phone within reach. Primary nurse assumed care. [facility] aware of the patient's arrival to the unit. none

## 2019-05-02 NOTE — PROGRESS NOTES
[facility]     Daily Progress Note      Admit Date:  4/30/2019    Problem List:       Subjective:    HPI:   Ms. Barrett Tucker is doing better now. No chest pain no shortness of breath. The patient was seen and examined. Notes reviewed. There were not complications over night. The patient is being seen for ischemic heart disease. Review of Systems  A comprehensive review of systems was negative. Objective: Intake/Output Summary (Last 24 hours) at 5/2/2019 1319  Last data filed at 5/2/2019 1246  Gross per 24 hour   Intake 2958 ml   Output 1600 ml   Net 1358 ml     Vitals:    05/02/19 0807 05/02/19 0815 05/02/19 1030 05/02/19 1200   BP:  115/82  122/79   Pulse:   107 88   Resp: 18 18  18   Temp:  98.3 °F (36.8 °C)  97.9 °F (36.6 °C)   TempSrc:  Oral  Oral   SpO2: 93%   96%   Weight:       Height:           Physical Exam:     Gen: No Acute Distress, pleasant patient answering questions appropriately    HEENT: pupils equally round and reactive to light and accomodation, extra ocular motor intact, normocephalic/atraumatic,    NECK: no jvd appreciated, no lymphadenopathy, no thyromegaly, no carotid bruits    CVS: Regular rate and rhythm, no murmur, no rubs and gallops     CHEST: Breath sounds are equal to auscultation bilaterally, no wheezes/rales/rhonchi appreciated    ABD: Soft, nontender, nondistended, + bowel sounds appreciated, no hepatosplenomegaly, no rebound/guarding    EXT: No peripheral Edema, + pulses b/l lower extremities     NEURO: Awake, Alert and oriented x 3 (person/place/time)  .   Skin: No rash noted, no bruising    Medications:    metoprolol succinate  12.5 mg Oral QPM    sodium chloride flush  10 mL Intravenous 2 times per day    atorvastatin  40 mg Oral Nightly    aspirin  81 mg Oral Daily    ticagrelor  90 mg Oral BID    pantoprazole  40 mg Intravenous Daily    And    sodium chloride (PF)  10 mL Intravenous Daily    lidocaine 1 % injection  5 mL Intradermal Once      DOPamine Stopped (05/01/19 0750)    sodium chloride Stopped (05/02/19 0855)     sodium chloride flush, acetaminophen, sodium chloride, magnesium hydroxide, bisacodyl, ondansetron, perflutren lipid microspheres, promethazine, diphenhydrAMINE    Lab Data:  CBC:   Recent Labs     05/01/19  0507 05/01/19  1906 05/02/19  0555   WBC 11.2* 13.1* 11.1*   HGB 9.0* 9.7* 8.9*     221 205 195     BMP:    Recent Labs     05/01/19  0507      K 3.5   CO2 21   BUN 12   CREATININE 0.9     LIVR:   Recent Labs     05/01/19 0507   AST 56*   ALT 12     PT/INR:   Recent Labs     05/01/19 0507   PROT 5.2*     APTT: No results for input(s): APTT in the last 72 hours. BNP:  No results for input(s): BNP in the last 72 hours. CARDIAC ENZYMES:  Recent Labs     05/01/19 0507   TROPONINI 1.02*     FASTING LIPID PANEL:  Lab Results   Component Value Date    CHOL 157 05/01/2019    HDL 57 05/01/2019    TRIG 158 05/01/2019       Diagnostics:    EKG:     ECHO: Normal left ventricle size, wall thickness, and systolic function with an   estimated ejection fraction of 60-65%.   No regional wall motion abnormalities are seen.   The right ventricle is normal in size and function. Assessment And Plan:    Patient Active Problem List   Diagnosis    Acute inferior myocardial infarction ([address]    Cardiogenic shock (HCC)     Acute inferior wall myocardial infarction leading to cardiac shock. Ischemic cardiomyopathy. Hypotension    Patient is status post percutaneous coronary intervention of right coronary artery and left circumflex artery. In the right coronary artery a 3 x 23 mm and a 2.75 x 12 mm drug-eluting stent was placed next line and left circumflex 2.75 x 12 mm drug-coated stent was placed. Continue dual antiplatelet therapy. Aggressive risk factor modification. Continue beta blocker. Ambulate and rehab. Signed:   Girish Martinez MD, MPH  [facility]

## 2019-05-03 ENCOUNTER — APPOINTMENT (OUTPATIENT)
Dept: CT IMAGING | Age: 49
DRG: 270 | End: 2019-05-03
Attending: INTERNAL MEDICINE
Payer: COMMERCIAL

## 2019-05-03 VITALS
TEMPERATURE: 97.9 F | OXYGEN SATURATION: 93 % | HEART RATE: 86 BPM | RESPIRATION RATE: 17 BRPM | DIASTOLIC BLOOD PRESSURE: 85 MMHG | WEIGHT: 172.18 LBS | HEIGHT: 67 IN | BODY MASS INDEX: 27.02 KG/M2 | SYSTOLIC BLOOD PRESSURE: 125 MMHG

## 2019-05-03 PROBLEM — E78.5 HYPERLIPIDEMIA LDL GOAL <70: Status: ACTIVE | Noted: 2019-05-03

## 2019-05-03 LAB
HCT VFR BLD CALC: 25.4 % (ref 36–48)
HEMOGLOBIN: 8.6 G/DL (ref 12–16)
MCH RBC QN AUTO: 28.8 PG (ref 26–34)
MCHC RBC AUTO-ENTMCNC: 33.9 G/DL (ref 31–36)
MCV RBC AUTO: 84.9 FL (ref 80–100)
PDW BLD-RTO: 12.6 % (ref 12.4–15.4)
PLATELET # BLD: 177 K/UL (ref 135–450)
PMV BLD AUTO: 8.5 FL (ref 5–10.5)
RBC # BLD: 2.99 M/UL (ref 4–5.2)
WBC # BLD: 11.3 K/UL (ref 4–11)

## 2019-05-03 PROCEDURE — 99239 HOSP IP/OBS DSCHRG MGMT >30: CPT | Performed by: NURSE PRACTITIONER

## 2019-05-03 PROCEDURE — 70450 CT HEAD/BRAIN W/O DYE: CPT

## 2019-05-03 PROCEDURE — 94760 N-INVAS EAR/PLS OXIMETRY 1: CPT

## 2019-05-03 PROCEDURE — 85027 COMPLETE CBC AUTOMATED: CPT

## 2019-05-03 PROCEDURE — 2580000003 HC RX 258: Performed by: INTERNAL MEDICINE

## 2019-05-03 PROCEDURE — 94762 N-INVAS EAR/PLS OXIMTRY CONT: CPT

## 2019-05-03 PROCEDURE — 6370000000 HC RX 637 (ALT 250 FOR IP)

## 2019-05-03 PROCEDURE — 6370000000 HC RX 637 (ALT 250 FOR IP): Performed by: INTERNAL MEDICINE

## 2019-05-03 RX ORDER — HYDROCODONE BITARTRATE AND ACETAMINOPHEN 5; 325 MG/1; MG/1
TABLET ORAL
Status: COMPLETED
Start: 2019-05-03 | End: 2019-05-03

## 2019-05-03 RX ORDER — FLUTICASONE PROPIONATE 50 MCG
1 SPRAY, SUSPENSION (ML) NASAL DAILY
Qty: 1 BOTTLE | Refills: 3 | Status: SHIPPED | OUTPATIENT
Start: 2019-05-03 | End: 2021-01-21

## 2019-05-03 RX ORDER — METOPROLOL SUCCINATE 25 MG/1
12.5 TABLET, EXTENDED RELEASE ORAL EVERY EVENING
Qty: 30 TABLET | Refills: 3 | Status: SHIPPED | OUTPATIENT
Start: 2019-05-03 | End: 2019-07-24 | Stop reason: ALTCHOICE

## 2019-05-03 RX ORDER — ASPIRIN 81 MG/1
81 TABLET ORAL DAILY
Qty: 30 TABLET | Refills: 3 | Status: SHIPPED | OUTPATIENT
Start: 2019-05-04 | End: 2019-08-25 | Stop reason: SDUPTHER

## 2019-05-03 RX ORDER — ATORVASTATIN CALCIUM 40 MG/1
40 TABLET, FILM COATED ORAL NIGHTLY
Qty: 30 TABLET | Refills: 3 | Status: SHIPPED | OUTPATIENT
Start: 2019-05-03 | End: 2019-06-14 | Stop reason: ALTCHOICE

## 2019-05-03 RX ORDER — HYDROCODONE BITARTRATE AND ACETAMINOPHEN 5; 325 MG/1; MG/1
1 TABLET ORAL
Status: COMPLETED | OUTPATIENT
Start: 2019-05-03 | End: 2019-05-03

## 2019-05-03 RX ORDER — FLUTICASONE PROPIONATE 50 MCG
1 SPRAY, SUSPENSION (ML) NASAL DAILY
Status: DISCONTINUED | OUTPATIENT
Start: 2019-05-03 | End: 2019-05-03 | Stop reason: HOSPADM

## 2019-05-03 RX ORDER — HYDROCODONE BITARTRATE AND ACETAMINOPHEN 5; 325 MG/1; MG/1
2 TABLET ORAL
Status: COMPLETED | OUTPATIENT
Start: 2019-05-03 | End: 2019-05-03

## 2019-05-03 RX ADMIN — HYDROCODONE BITARTRATE AND ACETAMINOPHEN 2 TABLET: 5; 325 TABLET ORAL at 06:18

## 2019-05-03 RX ADMIN — Medication 10 ML: at 11:23

## 2019-05-03 RX ADMIN — TICAGRELOR 90 MG: 90 TABLET ORAL at 11:22

## 2019-05-03 RX ADMIN — ASPIRIN 81 MG: 81 TABLET, COATED ORAL at 11:22

## 2019-05-03 RX ADMIN — ACETAMINOPHEN 650 MG: 325 TABLET ORAL at 11:22

## 2019-05-03 ASSESSMENT — PAIN DESCRIPTION - PROGRESSION
CLINICAL_PROGRESSION: GRADUALLY WORSENING
CLINICAL_PROGRESSION: GRADUALLY WORSENING

## 2019-05-03 ASSESSMENT — PAIN DESCRIPTION - FREQUENCY
FREQUENCY: INTERMITTENT
FREQUENCY: INTERMITTENT

## 2019-05-03 ASSESSMENT — PAIN DESCRIPTION - LOCATION
LOCATION: HEAD
LOCATION: HEAD

## 2019-05-03 ASSESSMENT — PAIN DESCRIPTION - DESCRIPTORS
DESCRIPTORS: HEADACHE
DESCRIPTORS: HEADACHE

## 2019-05-03 ASSESSMENT — PAIN - FUNCTIONAL ASSESSMENT
PAIN_FUNCTIONAL_ASSESSMENT: PREVENTS OR INTERFERES SOME ACTIVE ACTIVITIES AND ADLS
PAIN_FUNCTIONAL_ASSESSMENT: PREVENTS OR INTERFERES SOME ACTIVE ACTIVITIES AND ADLS

## 2019-05-03 ASSESSMENT — PAIN DESCRIPTION - ONSET
ONSET: ON-GOING
ONSET: ON-GOING

## 2019-05-03 ASSESSMENT — PAIN SCALES - GENERAL
PAINLEVEL_OUTOF10: 0
PAINLEVEL_OUTOF10: 7
PAINLEVEL_OUTOF10: 0
PAINLEVEL_OUTOF10: 0
PAINLEVEL_OUTOF10: 10
PAINLEVEL_OUTOF10: 2
PAINLEVEL_OUTOF10: 0
PAINLEVEL_OUTOF10: 2
PAINLEVEL_OUTOF10: 0

## 2019-05-03 ASSESSMENT — PAIN DESCRIPTION - ORIENTATION
ORIENTATION: POSTERIOR
ORIENTATION: POSTERIOR

## 2019-05-03 ASSESSMENT — PAIN DESCRIPTION - PAIN TYPE
TYPE: ACUTE PAIN
TYPE: ACUTE PAIN

## 2019-05-03 NOTE — PROGRESS NOTES
intolerance. No excessive thirst, fluid intake, or urination. No tremor. · Hematologic/Lymphatic: No abnormal bruising or bleeding, blood clots or swollen lymph nodes. · Allergic/Immunologic: No nasal congestion or hives. Objective:   /85   Pulse 86   Temp 97.9 °F (36.6 °C) (Oral)   Resp 17   Ht 5' 7\" (1.702 m)   Wt 172 lb 2.9 oz (78.1 kg)   SpO2 93%   BMI 26.97 kg/m²       Intake/Output Summary (Last 24 hours) at 5/3/2019 1119  Last data filed at 5/2/2019 2300  Gross per 24 hour   Intake 370 ml   Output --   Net 370 ml     Wt Readings from Last 3 Encounters:   05/03/19 172 lb 2.9 oz (78.1 kg)       Physical Exam:  General: In no acute distress. Awake, alert, and oriented X4. Ambulating in room without complaints. Skin:  Warm and dry. No new appearing rashes or lesions. ENT: Severa Columbus. EOM's intact. No focal deficits. No maxillary sinus tenderness  Neck:  Supple. No JVD or carotid bruit appreciated. No thyromegaly. No lymphadenopathy. Chest: Lungs clear to auscultation. No wheezes/rhonchi/rales  Cardiovascular:  RRR. Normal S1 and S2. No murmur/gallop or rub   Abdomen:  soft, nontender, nondistended, +bowel sounds. No hepatomegaly  Extremities:  No LE edema. No clubbing or cyanosis. 2+ bilateral radial/DP/PT pulses. Cap refill brisk. R groin site unremarkable and without hematoma or ecchymosis.     Medications:    metoprolol succinate  12.5 mg Oral QPM    sodium chloride flush  10 mL Intravenous 2 times per day    atorvastatin  40 mg Oral Nightly    aspirin  81 mg Oral Daily    ticagrelor  90 mg Oral BID    pantoprazole  40 mg Intravenous Daily    And    sodium chloride (PF)  10 mL Intravenous Daily    lidocaine 1 % injection  5 mL Intradermal Once      DOPamine Stopped (05/01/19 0750)    sodium chloride Stopped (05/02/19 0855)     sodium chloride flush, acetaminophen, sodium chloride, magnesium hydroxide, bisacodyl, ondansetron, perflutren lipid microspheres, promethazine, diphenhydrAMINE    Lab Data:  CBC:   Recent Labs     05/01/19  1906 05/02/19  0555 05/03/19  0625   WBC 13.1* 11.1* 11.3*   HGB 9.7* 8.9* 8.6*    195 177     BMP:    Recent Labs     05/01/19  0507      K 3.5   CO2 21   BUN 12   CREATININE 0.9     LIVR:   Recent Labs     05/01/19  0507   AST 56*   ALT 12     Results for Katty Shaikh (MRN [id number]) as of 5/3/2019 11:18   Ref. Range 5/1/2019 05:07   Cholesterol, Total Latest Ref Range: 0 - 199 mg/dL 157   HDL Cholesterol Latest Ref Range: 40 - 60 mg/dL 57   LDL Calculated Latest Ref Range: <100 mg/dL 68   Triglycerides Latest Ref Range: 0 - 150 mg/dL 158 (H)   VLDL Cholesterol Calculated Latest Ref Range: Not Established mg/dL 32   Troponin Latest Ref Range: <0.01 ng/mL 1.02 (HH)       CT head 5/3/2019:  No acute intracranial abnormality.       Paranasal sinus disease       5/1/2019 Echo:  Normal left ventricle size, wall thickness, and systolic function with an   estimated ejection fraction of 60-65%.   No regional wall motion abnormalities are seen.   The right ventricle is normal in size and function. Coronary angiogram with PCI 4/30/2019:  1. Right-dominant coronary arterial circulation with 100% occlusion of  the proximal RCA. This was covered with overlapping stents. The more  proximal stent was a 3 mm x 23-mm [place] drug-eluting stent dilated to  3.1. Distal to this and overlapping was a 2.75 x 12-mm [place]  drug-eluting stent. In the left main, there was no significant disease. The circumflex had a 99% proximal lesion that was intervened upon with a  2.75 x 12-mm [place] drug-eluting stent dilated to 2.75 mm. In the left  anterior descending artery, there is a focal 60% lesion present with no  flow limitation. 2.  Preserved left ventricular systolic function. LV ejection fraction  50% but basal to mid-inferior wall hypokinesis. 3.  Left ventricular end-diastolic pressure 11 to 13 mmHg.   4.  No gradient across the aortic valve on

## 2019-05-03 NOTE — PLAN OF CARE
Problem: Falls - Risk of:  Goal: Will remain free from falls  Description  Will remain free from falls  Outcome: Ongoing   Patient is ambulatory in room. Has a steady gait. Problem: Risk for Impaired Skin Integrity  Goal: Tissue integrity - skin and mucous membranes  Description  Structural intactness and normal physiological function of skin and  mucous membranes. Outcome: Ongoing  She turns herself in bed. Problem: Anxiety:  Goal: Level of anxiety will decrease  Description  Level of anxiety will decrease  Outcome: Ongoing   Under control, Will continue to monitor.

## 2019-05-03 NOTE — DISCHARGE SUMMARY
[address] [place] [address] SUMMARY      Patient ID:  Michael Baron  [id number] 46 y.o. 1970    Admit date: 4/30/2019    Discharge date:  5/3/2019    Admitting Physician: Angie Torre MD     Discharge Physician: Penelope Hanson     Admission Diagnoses: Acute inferior myocardial infarction [facility]) [I21.19]    Discharge Diagnoses:   Patient Active Problem List   Diagnosis    Acute inferior myocardial infarction [facility])    Cardiogenic shock ([address]    Hyperlipidemia LDL goal <70    Anemia    Headache, unspecified headache type        Discharged Condition: good    Hospital Course: Michael Baron was admitted with chest pain. She is a 47 y/o female with PMH significant for priro tobacco use who presented to [facility] with c/o chest pain off and on x 1 week prior to admit. Had been seen several days prior in ED in [place] and had CTA chest that was unremarkable for PE or aneurysm/dissection. On day of admit she presented with recurrent chest pain, numbness/tingling in her L arm and on arrival at [facility] noted to have TW inversion inferolaterally and elevated troponin. She was brought to [facility] for coronary angiography and had PCI with MELBA x 2 to RCA and MELBA to proximal circ. Last evening c/o \"terrible\" headache and CT head was performed which showed no acute abnormality. Her H/A has improved today with Tylenol. Flonase added.       Consults:  IP CONSULT TO CARDIAC REHAB    Significant Diagnostic Studies:     CT head 5/3/2019:  No acute intracranial abnormality.       Paranasal sinus disease         5/1/2019 Echo:  Normal left ventricle size, wall thickness, and systolic function with an   estimated ejection fraction of 60-65%.   No regional wall motion abnormalities are seen.   The right ventricle is normal in size and function.     Coronary angiogram with PCI 4/30/2019:  1.  Right-dominant coronary arterial circulation with 100% occlusion of  the proximal RCA.  This was covered with overlapping stents. Zbigniew Gillespie

## 2019-05-03 NOTE — CARE COORDINATION
Met w/pt to address barriers to dc. Pt gave me permission to speak about dc plan with visitor present. Briefly met with pt. Pt states she is independent w/o AD and is not active with any agencies or services.        DME: denied    ACTIVE SERVICES: denied    TRANSPORTATION: family    PHARMACY: [facility] Justo Gant denies difficulty obtaining/taking meds, discussed Tawny Mehta and she was agreeable for retail to fill-informed MILA Darling    PCP: Jacqueline Morocho to reach admitting but unsuccessful, MD is not in our system    DEMOGRAPHICS: verified address/phone number as correct    INSURANCE:  [employer]    HD/PD/O2: denied    THERAPY RECS: not ordered    PLAN:Home w/familyJan to retail    Av Neville RN  Case management  [phone]

## 2019-05-17 ENCOUNTER — OFFICE VISIT (OUTPATIENT)
Dept: CARDIOLOGY CLINIC | Age: 49
End: 2019-05-17
Payer: COMMERCIAL

## 2019-05-17 VITALS
HEIGHT: 67 IN | DIASTOLIC BLOOD PRESSURE: 70 MMHG | OXYGEN SATURATION: 97 % | BODY MASS INDEX: 25.11 KG/M2 | WEIGHT: 160 LBS | SYSTOLIC BLOOD PRESSURE: 100 MMHG | HEART RATE: 86 BPM

## 2019-05-17 DIAGNOSIS — E78.5 HYPERLIPIDEMIA LDL GOAL <70: Primary | ICD-10-CM

## 2019-05-17 DIAGNOSIS — I25.10 CORONARY ARTERY DISEASE INVOLVING NATIVE CORONARY ARTERY OF NATIVE HEART WITHOUT ANGINA PECTORIS: ICD-10-CM

## 2019-05-17 PROCEDURE — 99214 OFFICE O/P EST MOD 30 MIN: CPT | Performed by: INTERNAL MEDICINE

## 2019-05-17 PROCEDURE — 93000 ELECTROCARDIOGRAM COMPLETE: CPT | Performed by: INTERNAL MEDICINE

## 2019-05-17 RX ORDER — FEXOFENADINE HCL 180 MG/1
TABLET ORAL
COMMUNITY
End: 2021-01-21

## 2019-05-17 RX ORDER — CETIRIZINE HYDROCHLORIDE 10 MG/1
10 TABLET ORAL DAILY
COMMUNITY
End: 2019-11-15

## 2019-05-17 NOTE — LETTER
[address]                    [facility] HEART [address]. Krystyna Palomino [address]  Phone: [phone]  Fax: [phone]            May 17, 2019       Patient: Maria Fernanda Horn   MR Number: [id number]   YOB: 1970   Date of Visit: 5/17/2019       Dear Dr. Jonathan Polanco: Thank you for the request for consultation for Maria Fernanda Horn to me. Below are the relevant portions of my assessment and plan of care. The patient is 46 y.o. female with a past medical history significant for CAD, hyperlipidemia and tobacco abuse. She presented to [facility] with chest pain with associated tingling and numbness in her left arm. She was found to have T wave inversion inferolaterally and elevated troponin assays consistent with NSTEMI. She was urgently flown to [facility]. She underwent coronary angiogram on 4/30/19 resulting in PCI with MELBA x 2 to her 10% occluded proximal RCA. She also underwent MELBA placement to proximal Circumflex lesion. She presents today for follow up. Today, she states she feels \"okay\". She does admit to some fatigue as well as shortness of breath. Her shortness of breath is episodic and resolves with a deep breath. Since she has no longer been taking hormone therapy, she has been experiencing night sweats and not sleep well at night. She denies chest pain currently. She has been walking with her daughter and denies chest pain or shortness of breath with this exertion. Her groin site has healed well. Her previous angina presented as left arm tingling and severe, sharp chest pain. Her father has a cardiac history including bypass. She also reports uncles on both her mother and father's side with cardiac history. She was a previous smoker and quit many years ago. Review of Systems:  Constitutional: No fatigue, weakness, night sweats or fever. HEENT: No new vision difficulties or ringing in the ears. 50% but basal to mid-inferior wall hypokinesis. 3.  Left ventricular end-diastolic pressure 11 to 13 mmHg. 4.  No gradient across the aortic valve on pullback to suggest aortic  stenosis.       Imaging:     Echo 5/1/19  Normal left ventricle size, wall thickness, and systolic function with an  estimated ejection fraction of 60-65%. No regional wall motion abnormalities are seen. The right ventricle is normal in size and function.     Lab Review:   Lab Results   Component Value Date    TRIG 158 05/01/2019    HDL 57 05/01/2019    LDLCALC 68 05/01/2019    LABVLDL 32 05/01/2019     Lab Results   Component Value Date     05/01/2019    K 3.5 05/01/2019    BUN 12 05/01/2019    CREATININE 0.9 05/01/2019         Assessment:  1. CAD of native coronary arteries without angina  2. Hyperlipidemia with LDL goal <70mg/dL     Plan:  I think that Ms. Kirsten Haider  is entirely stable from a cardiovascular standpoint. I see no need to make any changes currently in her medical regimen. The shortness of breath she describes is likely related to Brilinta and I discussed this with her and that it should improve. I will have her repeat a fasting lipid profile in the next couple weeks. I have encouraged her to participate in cardiac rehab and this can be done at [facility] for convenience of location. I will see her in office for follow up in 2 months. If you have questions, please do not hesitate to call me. I look forward to following Zuleima Davis along with you. Sincerely,        Tremayne Antonio MD    CC providers:   Kenneth Khan MD  [facility], [address]  [address] [place] [address] St: [phone]

## 2019-05-17 NOTE — COMMUNICATION BODY
[address] [facility]  1970    May 17, 2019    Reason for Consult: CAD    CC: Chest Pain    HPI:  The patient is 46 y.o. female with a past medical history significant for CAD, hyperlipidemia and tobacco abuse. She presented to [facility] with chest pain with associated tingling and numbness in her left arm. She was found to have T wave inversion inferolaterally and elevated troponin assays consistent with NSTEMI. She was urgently flown to [facility]. She underwent coronary angiogram on 4/30/19 resulting in PCI with MELBA x 2 to her 10% occluded proximal RCA. She also underwent MELBA placement to proximal Circumflex lesion. She presents today for follow up. Today, she states she feels \"okay\". She does admit to some fatigue as well as shortness of breath. Her shortness of breath is episodic and resolves with a deep breath. Since she has no longer been taking hormone therapy, she has been experiencing night sweats and not sleep well at night. She denies chest pain currently. She has been walking with her daughter and denies chest pain or shortness of breath with this exertion. Her groin site has healed well. Her previous angina presented as left arm tingling and severe, sharp chest pain. Her father has a cardiac history including bypass. She also reports uncles on both her mother and father's side with cardiac history. She was a previous smoker and quit many years ago. Review of Systems:  Constitutional: No fatigue, weakness, night sweats or fever. HEENT: No new vision difficulties or ringing in the ears. Respiratory: No new SOB, PND, orthopnea or cough. Cardiovascular: See HPI   GI: No n/v, diarrhea, constipation, abdominal pain or changes in bowel habits. No melena, no hematochezia  : No urinary frequency, urgency, incontinence, hematuria or dysuria. Skin: No cyanosis or skin lesions. Musculoskeletal: No new muscle or joint pain.   Neurological: No syncope or TIA-like symptoms. Psychiatric: No anxiety, insomnia or depression     Past Medical History:   Diagnosis Date    CAD (coronary artery disease)     Hyperlipidemia      Past Surgical History:   Procedure Laterality Date    CERVICAL FUSION  11/2018    HYSTERECTOMY      TONSILLECTOMY       No family history on file. Social History     Tobacco Use    Smoking status: Former Smoker    Smokeless tobacco: Never Used   Substance Use Topics    Alcohol use: Yes     Frequency: Monthly or less    Drug use: Never       Allergies   Allergen Reactions    Penicillins      Current Outpatient Medications   Medication Sig Dispense Refill    cetirizine (ZYRTEC) 10 MG tablet Take 10 mg by mouth daily      Fexofenadine HCl (MUCINEX ALLERGY PO) Take by mouth      aspirin 81 MG EC tablet Take 1 tablet by mouth daily 30 tablet 3    atorvastatin (LIPITOR) 40 MG tablet Take 1 tablet by mouth nightly 30 tablet 3    metoprolol succinate (TOPROL XL) 25 MG extended release tablet Take 0.5 tablets by mouth every evening 30 tablet 3    fluticasone (FLONASE) 50 MCG/ACT nasal spray 1 spray by Each Nare route daily 1 Bottle 3    ticagrelor (BRILINTA) 90 MG TABS tablet Take 1 tablet by mouth 2 times daily 60 tablet 0     No current facility-administered medications for this visit. Physical Exam:   /70 (Site: Right Upper Arm, Position: Sitting, Cuff Size: Small Adult)   Pulse 86   Ht 5' 7\" (1.702 m)   Wt 160 lb (72.6 kg)   SpO2 97%   BMI 25.06 kg/m²   No intake or output data in the 24 hours ending 05/17/19 1353  Wt Readings from Last 2 Encounters:   05/17/19 160 lb (72.6 kg)   05/03/19 172 lb 2.9 oz (78.1 kg)     Constitutional: She is oriented to person, place, and time. She appears well-developed and well-nourished. In no acute distress. Head: Normocephalic and atraumatic. Neck: Neck supple. No JVD present. Carotid bruit is not present. No mass and no thyromegaly present.  No lymphadenopathy present. Cardiovascular: Normal rate, regular rhythm, normal heart sounds and intact distal pulses. Exam reveals no gallop and no friction rub. No murmur heard. Pulmonary/Chest: Effort normal and breath sounds normal. No respiratory distress. She has no wheezes, rhonchi or rales. Abdominal: Soft, non-tender. Bowel sounds and aorta are normal. She exhibits no organomegaly, mass or bruit. Extremities: No edema, cyanosis, or clubbing. Pulses are 2+ radial/carotid/dorsalis pedis and posterior tibial bilaterally. Neurological: She is alert and oriented to person, place, and time. She has normal reflexes. No cranial nerve deficit. Coordination normal.   Skin: Skin is warm and dry. There is no rash or diaphoresis. Psychiatric: She has a normal mood and affect. Her speech is normal and behavior is normal.     EKG Interpretation 5/17/19: Sinus rhythm with prior inferior infarct      Procedures:     [facility] 4/30/19  1. Right-dominant coronary arterial circulation with 100% occlusion of  the proximal RCA. This was covered with overlapping stents. The more  proximal stent was a 3 mm x 23-mm [place] drug-eluting stent dilated to  3.1. Distal to this and overlapping was a 2.75 x 12-mm [place]  drug-eluting stent. In the left main, there was no significant disease. The circumflex had a 99% proximal lesion that was intervened upon with a  2.75 x 12-mm [place] drug-eluting stent dilated to 2.75 mm. In the left  anterior descending artery, there is a focal 60% lesion present with no  flow limitation. 2.  Preserved left ventricular systolic function. LV ejection fraction  50% but basal to mid-inferior wall hypokinesis. 3.  Left ventricular end-diastolic pressure 11 to 13 mmHg. 4.  No gradient across the aortic valve on pullback to suggest aortic  stenosis.       Imaging:     Echo 5/1/19  Normal left ventricle size, wall thickness, and systolic function with an  estimated ejection fraction of 60-65%.   No regional wall motion abnormalities are seen. The right ventricle is normal in size and function.     Lab Review:   Lab Results   Component Value Date    TRIG 158 05/01/2019    HDL 57 05/01/2019    LDLCALC 68 05/01/2019    LABVLDL 32 05/01/2019     Lab Results   Component Value Date     05/01/2019    K 3.5 05/01/2019    BUN 12 05/01/2019    CREATININE 0.9 05/01/2019         Assessment:  1. CAD of native coronary arteries without angina  2. Hyperlipidemia with LDL goal <70mg/dL     Plan:  I think that Ms. Chucho Tan  is entirely stable from a cardiovascular standpoint. I see no need to make any changes currently in her medical regimen. The shortness of breath she describes is likely related to Brilinta and I discussed this with her and that it should improve. I will have her repeat a fasting lipid profile in the next couple weeks. I have encouraged her to participate in cardiac rehab and this can be done at [facility] for convenience of location. I will see her in office for follow up in 2 months. This note was scribed in the presence of Flynn Horn MD by General Perez, RN. Physician Attestation:  The scribes documentation has been prepared under my direction and personally reviewed by me in its entirety. I, Dr. Gregorio Damon personally performed the services described in this documentation as scribed by my RN,  [employer] in my presence, and I confirm that the note above accurately reflects all work, treatment, procedures, and medical decision making performed by me.

## 2019-05-17 NOTE — PATIENT INSTRUCTIONS
Patient Education        Learning About Coronary Artery Disease (CAD)  What is coronary artery disease? Coronary artery disease (CAD) occurs when plaque builds up in the arteries that bring oxygen-rich blood to your heart. Plaque is a fatty substance made of cholesterol, calcium, and other substances in the blood. This process is called hardening of the arteries, or atherosclerosis. What happens when you have coronary artery disease? · Plaque may narrow the coronary arteries. Narrowed arteries cause poor blood flow. This can lead to angina symptoms such as chest pain or discomfort. If blood flow is completely blocked, you could have a heart attack. · You can slow CAD and reduce the risk of future problems by making changes in your lifestyle. These include quitting smoking and eating heart-healthy foods. · Treatments for CAD, along with changes in your lifestyle, can help you live a longer and healthier life. How can you prevent coronary artery disease? · Do not smoke. It may be the best thing you can do to prevent heart disease. If you need help quitting, talk to your doctor about stop-smoking programs and medicines. These can increase your chances of quitting for good. · Be active. Get at least 30 minutes of exercise on most days of the week. Walking is a good choice. You also may want to do other activities, such as running, swimming, cycling, or playing tennis or team sports. · Eat heart-healthy foods. Eat more fruits and vegetables and less foods that contain saturated and trans fats. Limit alcohol, sodium, and sweets. · Stay at a healthy weight. Lose weight if you need to. · Manage other health problems such as diabetes, high blood pressure, and high cholesterol. · Manage stress. Stress can hurt your heart. To keep stress low, talk about your problems and feelings. Don't keep your feelings hidden. · If you have talked about it with your doctor, take a low-dose aspirin every day.  Aspirin can steam foods instead of frying them. · Choose lean meats instead of high-fat meats such as hot dogs and sausages. Cut off all visible fat when you prepare meat. · Eat fish, skinless poultry, and meat alternatives such as soy products instead of high-fat meats. Soy products, such as tofu, may be especially good for your heart. · Choose low-fat or fat-free milk and dairy products. Eat fish  · Eat at least two servings of fish a week. Certain fish, such as salmon and tuna, contain omega-3 fatty acids, which may help reduce your risk of heart attack. Eat foods high in fiber  · Eat a variety of grain products every day. Include whole-grain foods that have lots of fiber and nutrients. Examples of whole-grain foods include oats, whole wheat bread, and brown rice. · Buy whole-grain breads and cereals, instead of white bread or pastries. Limit salt and sodium  · Limit how much salt and sodium you eat to help lower your blood pressure. · Taste food before you salt it. Add only a little salt when you think you need it. With time, your taste buds will adjust to less salt. · Eat fewer snack items, fast foods, and other high-salt, processed foods. Check food labels for the amount of sodium in packaged foods. · Choose low-sodium versions of canned goods (such as soups, vegetables, and beans). Limit sugar  · Limit drinks and foods with added sugar. These include candy, desserts, and soda pop. Limit alcohol  · Limit alcohol to no more than 2 drinks a day for men and 1 drink a day for women. Too much alcohol can cause health problems. When should you call for help? Watch closely for changes in your health, and be sure to contact your doctor if:    · You would like help planning heart-healthy meals. Where can you learn more? Go to https://chbryaneb.[facility]. org and sign in to your [employer] account. Enter V137 in the [facility] box to learn more about \"Heart-Healthy Diet: Care Instructions. \" If you do not have an account, please click on the \"Sign Up Now\" link. Current as of: July 22, 2018  Content Version: 12.0  © 2780-5186 [employer]. Care instructions adapted under license by [facility] [place] ([facility]). If you have questions about a medical condition or this instruction, always ask your healthcare professional. [address] any warranty or liability for your use of this information.

## 2019-05-17 NOTE — PROGRESS NOTES
[address] [facility]  1970    May 17, 2019    Reason for Consult: CAD    CC: Chest Pain    HPI:  The patient is 46 y.o. female with a past medical history significant for CAD, hyperlipidemia and tobacco abuse. She presented to [facility] with chest pain with associated tingling and numbness in her left arm. She was found to have T wave inversion inferolaterally and elevated troponin assays consistent with NSTEMI. She was urgently flown to [facility]. She underwent coronary angiogram on 4/30/19 resulting in PCI with MELBA x 2 to her 10% occluded proximal RCA. She also underwent MELBA placement to proximal Circumflex lesion. She presents today for follow up. Today, she states she feels \"okay\". She does admit to some fatigue as well as shortness of breath. Her shortness of breath is episodic and resolves with a deep breath. Since she has no longer been taking hormone therapy, she has been experiencing night sweats and not sleep well at night. She denies chest pain currently. She has been walking with her daughter and denies chest pain or shortness of breath with this exertion. Her groin site has healed well. Her previous angina presented as left arm tingling and severe, sharp chest pain. Her father has a cardiac history including bypass. She also reports uncles on both her mother and father's side with cardiac history. She was a previous smoker and quit many years ago. Review of Systems:  Constitutional: No fatigue, weakness, night sweats or fever. HEENT: No new vision difficulties or ringing in the ears. Respiratory: No new SOB, PND, orthopnea or cough. Cardiovascular: See HPI   GI: No n/v, diarrhea, constipation, abdominal pain or changes in bowel habits. No melena, no hematochezia  : No urinary frequency, urgency, incontinence, hematuria or dysuria. Skin: No cyanosis or skin lesions. Musculoskeletal: No new muscle or joint pain.   Neurological: No syncope present. Cardiovascular: Normal rate, regular rhythm, normal heart sounds and intact distal pulses. Exam reveals no gallop and no friction rub. No murmur heard. Pulmonary/Chest: Effort normal and breath sounds normal. No respiratory distress. She has no wheezes, rhonchi or rales. Abdominal: Soft, non-tender. Bowel sounds and aorta are normal. She exhibits no organomegaly, mass or bruit. Extremities: No edema, cyanosis, or clubbing. Pulses are 2+ radial/carotid/dorsalis pedis and posterior tibial bilaterally. Neurological: She is alert and oriented to person, place, and time. She has normal reflexes. No cranial nerve deficit. Coordination normal.   Skin: Skin is warm and dry. There is no rash or diaphoresis. Psychiatric: She has a normal mood and affect. Her speech is normal and behavior is normal.     EKG Interpretation 5/17/19: Sinus rhythm with prior inferior infarct      Procedures:     [facility] 4/30/19  1. Right-dominant coronary arterial circulation with 100% occlusion of  the proximal RCA. This was covered with overlapping stents. The more  proximal stent was a 3 mm x 23-mm [place] drug-eluting stent dilated to  3.1. Distal to this and overlapping was a 2.75 x 12-mm [place]  drug-eluting stent. In the left main, there was no significant disease. The circumflex had a 99% proximal lesion that was intervened upon with a  2.75 x 12-mm [place] drug-eluting stent dilated to 2.75 mm. In the left  anterior descending artery, there is a focal 60% lesion present with no  flow limitation. 2.  Preserved left ventricular systolic function. LV ejection fraction  50% but basal to mid-inferior wall hypokinesis. 3.  Left ventricular end-diastolic pressure 11 to 13 mmHg. 4.  No gradient across the aortic valve on pullback to suggest aortic  stenosis.       Imaging:     Echo 5/1/19  Normal left ventricle size, wall thickness, and systolic function with an  estimated ejection fraction of 60-65%.   No regional wall motion abnormalities are seen. The right ventricle is normal in size and function.     Lab Review:   Lab Results   Component Value Date    TRIG 158 05/01/2019    HDL 57 05/01/2019    LDLCALC 68 05/01/2019    LABVLDL 32 05/01/2019     Lab Results   Component Value Date     05/01/2019    K 3.5 05/01/2019    BUN 12 05/01/2019    CREATININE 0.9 05/01/2019         Assessment:  1. CAD of native coronary arteries without angina  2. Hyperlipidemia with LDL goal <70mg/dL     Plan:  I think that Ms. Keagan Shah  is entirely stable from a cardiovascular standpoint. I see no need to make any changes currently in her medical regimen. The shortness of breath she describes is likely related to Brilinta and I discussed this with her and that it should improve. I will have her repeat a fasting lipid profile in the next couple weeks. I have encouraged her to participate in cardiac rehab and this can be done at [facility] for convenience of location. I will see her in office for follow up in 2 months. This note was scribed in the presence of Cristel Dawkins MD by [employer], RN. Physician Attestation:  The scribes documentation has been prepared under my direction and personally reviewed by me in its entirety. I, Dr. Rylie Calabrese personally performed the services described in this documentation as scribed by my RN,  [employer] in my presence, and I confirm that the note above accurately reflects all work, treatment, procedures, and medical decision making performed by me.

## 2019-06-14 ENCOUNTER — TELEPHONE (OUTPATIENT)
Dept: CARDIOLOGY CLINIC | Age: 49
End: 2019-06-14

## 2019-06-14 DIAGNOSIS — E78.5 HYPERLIPIDEMIA LDL GOAL <70: Primary | ICD-10-CM

## 2019-06-14 RX ORDER — ROSUVASTATIN CALCIUM 20 MG/1
20 TABLET, COATED ORAL DAILY
Qty: 30 TABLET | Refills: 4 | Status: SHIPPED | OUTPATIENT
Start: 2019-06-14 | End: 2019-09-26 | Stop reason: SDUPTHER

## 2019-06-14 NOTE — TELEPHONE ENCOUNTER
Danna Burns returned the call to the office. Discussed her lab results with her and she is agreeable to switching her medication to rosuvastatin 20 mg daily for better control. Medication sent to pharmacy and orders placed for repeat lipid panel to be done in 3 months.

## 2019-06-24 RX ORDER — ATORVASTATIN CALCIUM 40 MG/1
TABLET, FILM COATED ORAL
Qty: 30 TABLET | Refills: 1 | Status: SHIPPED | OUTPATIENT
Start: 2019-06-24 | End: 2019-10-15

## 2019-07-08 ENCOUNTER — TELEPHONE (OUTPATIENT)
Dept: CARDIOLOGY CLINIC | Age: 49
End: 2019-07-08

## 2019-07-23 NOTE — PROGRESS NOTES
69 [facility]  1970 July 24, 2019    Reason for Consult: CAD    CC: Chest Pain    HPI:  The patient is 46 y.o. female with a past medical history significant for CAD, hyperlipidemia and tobacco abuse. She presented to [facility] with chest pain with associated tingling and numbness in her left arm. She was found to have T wave inversion inferolaterally and elevated troponin assays consistent with NSTEMI. She was urgently flown to [facility]. She underwent coronary angiogram on 4/30/19 resulting in PCI with MELBA x 2 to her 10% occluded proximal RCA. She also underwent MELBA placement to proximal Circumflex lesion. She presents today for follow up. Today, she reports feeling well other than fatigue. She does admit to poor sleep habits related to hot flashes and night sweats due to no longer being on hormone replacement therapy. She has been participating in cardiac rehab at [facility]. She denies chest pain and her breathing has been well. She reports medication compliance and is tolerating. Her previous angina presented as left arm tingling and severe, sharp chest pain. She was a previous smoker and quit many years ago. Review of Systems:  Constitutional: No fatigue, weakness, night sweats or fever. HEENT: No new vision difficulties or ringing in the ears. Respiratory: No new SOB, PND, orthopnea or cough. Cardiovascular: See HPI   GI: No n/v, diarrhea, constipation, abdominal pain or changes in bowel habits. No melena, no hematochezia  : No urinary frequency, urgency, incontinence, hematuria or dysuria. Skin: No cyanosis or skin lesions. Musculoskeletal: No new muscle or joint pain. Neurological: No syncope or TIA-like symptoms.   Psychiatric: No anxiety, insomnia or depression     Past Medical History:   Diagnosis Date    CAD (coronary artery disease)     Hyperlipidemia      Past Surgical History:   Procedure Laterality Date    CERVICAL FUSION

## 2019-07-24 ENCOUNTER — OFFICE VISIT (OUTPATIENT)
Dept: CARDIOLOGY CLINIC | Age: 49
End: 2019-07-24
Payer: COMMERCIAL

## 2019-07-24 VITALS
HEIGHT: 67 IN | BODY MASS INDEX: 25.06 KG/M2 | HEART RATE: 80 BPM | SYSTOLIC BLOOD PRESSURE: 104 MMHG | DIASTOLIC BLOOD PRESSURE: 80 MMHG | OXYGEN SATURATION: 95 %

## 2019-07-24 DIAGNOSIS — I25.10 CORONARY ARTERY DISEASE INVOLVING NATIVE CORONARY ARTERY OF NATIVE HEART WITHOUT ANGINA PECTORIS: Primary | ICD-10-CM

## 2019-07-24 DIAGNOSIS — R53.83 FATIGUE, UNSPECIFIED TYPE: ICD-10-CM

## 2019-07-24 DIAGNOSIS — E78.5 HYPERLIPIDEMIA LDL GOAL <70: ICD-10-CM

## 2019-07-24 PROCEDURE — 99214 OFFICE O/P EST MOD 30 MIN: CPT | Performed by: INTERNAL MEDICINE

## 2019-07-24 NOTE — PATIENT INSTRUCTIONS
Patient Education        Learning About Coronary Artery Disease (CAD)  What is coronary artery disease? Coronary artery disease (CAD) occurs when plaque builds up in the arteries that bring oxygen-rich blood to your heart. Plaque is a fatty substance made of cholesterol, calcium, and other substances in the blood. This process is called hardening of the arteries, or atherosclerosis. What happens when you have coronary artery disease? · Plaque may narrow the coronary arteries. Narrowed arteries cause poor blood flow. This can lead to angina symptoms such as chest pain or discomfort. If blood flow is completely blocked, you could have a heart attack. · You can slow CAD and reduce the risk of future problems by making changes in your lifestyle. These include quitting smoking and eating heart-healthy foods. · Treatments for CAD, along with changes in your lifestyle, can help you live a longer and healthier life. How can you prevent coronary artery disease? · Do not smoke. It may be the best thing you can do to prevent heart disease. If you need help quitting, talk to your doctor about stop-smoking programs and medicines. These can increase your chances of quitting for good. · Be active. Get at least 30 minutes of exercise on most days of the week. Walking is a good choice. You also may want to do other activities, such as running, swimming, cycling, or playing tennis or team sports. · Eat heart-healthy foods. Eat more fruits and vegetables and less foods that contain saturated and trans fats. Limit alcohol, sodium, and sweets. · Stay at a healthy weight. Lose weight if you need to. · Manage other health problems such as diabetes, high blood pressure, and high cholesterol. · Manage stress. Stress can hurt your heart. To keep stress low, talk about your problems and feelings. Don't keep your feelings hidden. · If you have talked about it with your doctor, take a low-dose aspirin every day.  Aspirin can help certain people lower their risk of a heart attack or stroke. But taking aspirin isn't right for everyone, because it can cause serious bleeding. Do not start taking daily aspirin unless your doctor knows about it. How is coronary artery disease treated? · Your doctor will suggest that you make lifestyle changes. For example, your doctor may ask you to eat healthy foods, quit smoking, lose extra weight, and be more active. · You will have to take medicines. · Your doctor may suggest a procedure to open narrowed or blocked arteries. This is called angioplasty. Or your doctor may suggest using healthy blood vessels to create detours around narrowed or blocked arteries. This is called bypass surgery. Follow-up care is a key part of your treatment and safety. Be sure to make and go to all appointments, and call your doctor if you are having problems. It's also a good idea to know your test results and keep a list of the medicines you take. Where can you learn more? Go to https://Docurated.ViaCyte. org and sign in to your Microstaq account. Enter [phone] in the Savored box to learn more about \"Learning About Coronary Artery Disease (CAD). \"     If you do not have an account, please click on the \"Sign Up Now\" link. Current as of: July 22, 2018  Content Version: 12.0  © 6216-7000 Healthwise, Incorporated. Care instructions adapted under license by [facility]. If you have questions about a medical condition or this instruction, always ask your healthcare professional. [place] [address] any warranty or liability for your use of this information. Patient Education        Heart-Healthy Diet: Care Instructions  Your Care Instructions    A heart-healthy diet has lots of vegetables, fruits, nuts, beans, and whole grains, and is low in salt. It limits foods that are high in saturated fat, such as meats, cheeses, and fried foods.  It may be hard to change your diet, but steam foods instead of frying them. · Choose lean meats instead of high-fat meats such as hot dogs and sausages. Cut off all visible fat when you prepare meat. · Eat fish, skinless poultry, and meat alternatives such as soy products instead of high-fat meats. Soy products, such as tofu, may be especially good for your heart. · Choose low-fat or fat-free milk and dairy products. Eat fish  · Eat at least two servings of fish a week. Certain fish, such as salmon and tuna, contain omega-3 fatty acids, which may help reduce your risk of heart attack. Eat foods high in fiber  · Eat a variety of grain products every day. Include whole-grain foods that have lots of fiber and nutrients. Examples of whole-grain foods include oats, whole wheat bread, and brown rice. · Buy whole-grain breads and cereals, instead of white bread or pastries. Limit salt and sodium  · Limit how much salt and sodium you eat to help lower your blood pressure. · Taste food before you salt it. Add only a little salt when you think you need it. With time, your taste buds will adjust to less salt. · Eat fewer snack items, fast foods, and other high-salt, processed foods. Check food labels for the amount of sodium in packaged foods. · Choose low-sodium versions of canned goods (such as soups, vegetables, and beans). Limit sugar  · Limit drinks and foods with added sugar. These include candy, desserts, and soda pop. Limit alcohol  · Limit alcohol to no more than 2 drinks a day for men and 1 drink a day for women. Too much alcohol can cause health problems. When should you call for help? Watch closely for changes in your health, and be sure to contact your doctor if:    · You would like help planning heart-healthy meals. Where can you learn more? Go to https://chbryaneb.health-partners. org and sign in to your SOLOMO365 account. Enter V137 in the [employer] box to learn more about \"Heart-Healthy Diet: Care Instructions. \"

## 2019-08-26 RX ORDER — ASPIRIN 81 MG/1
TABLET ORAL
Qty: 30 TABLET | Refills: 3 | Status: SHIPPED | OUTPATIENT
Start: 2019-08-26 | End: 2019-10-31

## 2019-09-26 ENCOUNTER — TELEPHONE (OUTPATIENT)
Dept: CARDIOLOGY CLINIC | Age: 49
End: 2019-09-26

## 2019-09-26 DIAGNOSIS — E78.5 HYPERLIPIDEMIA LDL GOAL <70: ICD-10-CM

## 2019-09-26 RX ORDER — ROSUVASTATIN CALCIUM 20 MG/1
TABLET, COATED ORAL
Qty: 30 TABLET | Refills: 5 | Status: SHIPPED | OUTPATIENT
Start: 2019-09-26 | End: 2019-10-11 | Stop reason: SDUPTHER

## 2019-10-11 DIAGNOSIS — E78.5 HYPERLIPIDEMIA LDL GOAL <70: ICD-10-CM

## 2019-10-11 RX ORDER — ROSUVASTATIN CALCIUM 20 MG/1
TABLET, COATED ORAL
Qty: 30 TABLET | Refills: 5 | Status: SHIPPED | OUTPATIENT
Start: 2019-10-11 | End: 2019-10-15 | Stop reason: SDUPTHER

## 2019-10-15 DIAGNOSIS — E78.5 HYPERLIPIDEMIA LDL GOAL <70: ICD-10-CM

## 2019-10-15 RX ORDER — ROSUVASTATIN CALCIUM 20 MG/1
TABLET, COATED ORAL
Qty: 30 TABLET | Refills: 5 | Status: SHIPPED | OUTPATIENT
Start: 2019-10-15 | End: 2019-10-31 | Stop reason: SDUPTHER

## 2019-10-22 ENCOUNTER — TELEPHONE (OUTPATIENT)
Dept: CARDIOLOGY CLINIC | Age: 49
End: 2019-10-22

## 2019-10-22 DIAGNOSIS — E78.5 HYPERLIPIDEMIA LDL GOAL <70: Primary | ICD-10-CM

## 2019-10-22 DIAGNOSIS — I21.19 ACUTE INFERIOR MYOCARDIAL INFARCTION (HCC): ICD-10-CM

## 2019-10-30 ENCOUNTER — PATIENT MESSAGE (OUTPATIENT)
Dept: CARDIOLOGY CLINIC | Age: 49
End: 2019-10-30

## 2019-10-30 ENCOUNTER — TELEPHONE (OUTPATIENT)
Dept: CARDIOLOGY CLINIC | Age: 49
End: 2019-10-30

## 2019-10-30 DIAGNOSIS — E78.5 HYPERLIPIDEMIA LDL GOAL <70: ICD-10-CM

## 2019-10-31 RX ORDER — ROSUVASTATIN CALCIUM 20 MG/1
TABLET, COATED ORAL
Qty: 90 TABLET | Refills: 2 | Status: SHIPPED | OUTPATIENT
Start: 2019-10-31 | End: 2020-09-03

## 2019-10-31 RX ORDER — ASPIRIN 81 MG/1
TABLET ORAL
Qty: 30 TABLET | Refills: 5 | Status: SHIPPED | OUTPATIENT
Start: 2019-10-31

## 2019-11-15 ENCOUNTER — HOSPITAL ENCOUNTER (EMERGENCY)
Age: 49
Discharge: HOME | End: 2019-11-15
Attending: EMERGENCY MEDICINE
Payer: COMMERCIAL

## 2019-11-15 ENCOUNTER — APPOINTMENT (OUTPATIENT)
Dept: GENERAL RADIOLOGY | Age: 49
End: 2019-11-15
Payer: COMMERCIAL

## 2019-11-15 VITALS
OXYGEN SATURATION: 100 % | HEIGHT: 67 IN | DIASTOLIC BLOOD PRESSURE: 90 MMHG | HEART RATE: 83 BPM | SYSTOLIC BLOOD PRESSURE: 135 MMHG | TEMPERATURE: 97.5 F | BODY MASS INDEX: 27.79 KG/M2 | RESPIRATION RATE: 26 BRPM | WEIGHT: 177.03 LBS

## 2019-11-15 DIAGNOSIS — R07.9 CHEST PAIN, UNSPECIFIED TYPE: Primary | ICD-10-CM

## 2019-11-15 LAB
ANION GAP SERPL CALCULATED.3IONS-SCNC: 15 MMOL/L (ref 3–16)
BASOPHILS ABSOLUTE: 0.1 K/UL (ref 0–0.2)
BASOPHILS RELATIVE PERCENT: 0.9 %
BUN BLDV-MCNC: 20 MG/DL (ref 7–20)
CALCIUM SERPL-MCNC: 10.5 MG/DL (ref 8.3–10.6)
CHLORIDE BLD-SCNC: 99 MMOL/L (ref 99–110)
CO2: 24 MMOL/L (ref 21–32)
CREAT SERPL-MCNC: 0.9 MG/DL (ref 0.6–1.1)
EKG ATRIAL RATE: 86 BPM
EKG DIAGNOSIS: NORMAL
EKG P AXIS: 49 DEGREES
EKG P-R INTERVAL: 162 MS
EKG Q-T INTERVAL: 354 MS
EKG QRS DURATION: 88 MS
EKG QTC CALCULATION (BAZETT): 423 MS
EKG R AXIS: -23 DEGREES
EKG T AXIS: 29 DEGREES
EKG VENTRICULAR RATE: 86 BPM
EOSINOPHILS ABSOLUTE: 0.1 K/UL (ref 0–0.6)
EOSINOPHILS RELATIVE PERCENT: 1.1 %
GFR AFRICAN AMERICAN: >60
GFR NON-AFRICAN AMERICAN: >60
GLUCOSE BLD-MCNC: 101 MG/DL (ref 70–99)
HCG QUALITATIVE: NEGATIVE
HCT VFR BLD CALC: 42.1 % (ref 36–48)
HEMOGLOBIN: 14.2 G/DL (ref 12–16)
LYMPHOCYTES ABSOLUTE: 2 K/UL (ref 1–5.1)
LYMPHOCYTES RELATIVE PERCENT: 18.6 %
MCH RBC QN AUTO: 28.9 PG (ref 26–34)
MCHC RBC AUTO-ENTMCNC: 33.6 G/DL (ref 31–36)
MCV RBC AUTO: 86 FL (ref 80–100)
MONOCYTES ABSOLUTE: 0.8 K/UL (ref 0–1.3)
MONOCYTES RELATIVE PERCENT: 7.2 %
NEUTROPHILS ABSOLUTE: 7.6 K/UL (ref 1.7–7.7)
NEUTROPHILS RELATIVE PERCENT: 72.2 %
PDW BLD-RTO: 13.4 % (ref 12.4–15.4)
PLATELET # BLD: 316 K/UL (ref 135–450)
PMV BLD AUTO: 8.3 FL (ref 5–10.5)
POTASSIUM REFLEX MAGNESIUM: 4.5 MMOL/L (ref 3.5–5.1)
RBC # BLD: 4.9 M/UL (ref 4–5.2)
SODIUM BLD-SCNC: 138 MMOL/L (ref 136–145)
TROPONIN: <0.01 NG/ML
WBC # BLD: 10.6 K/UL (ref 4–11)

## 2019-11-15 PROCEDURE — 84484 ASSAY OF TROPONIN QUANT: CPT

## 2019-11-15 PROCEDURE — 93005 ELECTROCARDIOGRAM TRACING: CPT | Performed by: EMERGENCY MEDICINE

## 2019-11-15 PROCEDURE — 84703 CHORIONIC GONADOTROPIN ASSAY: CPT

## 2019-11-15 PROCEDURE — 85025 COMPLETE CBC W/AUTO DIFF WBC: CPT

## 2019-11-15 PROCEDURE — 80048 BASIC METABOLIC PNL TOTAL CA: CPT

## 2019-11-15 PROCEDURE — 93010 ELECTROCARDIOGRAM REPORT: CPT | Performed by: INTERNAL MEDICINE

## 2019-11-15 PROCEDURE — 71046 X-RAY EXAM CHEST 2 VIEWS: CPT

## 2019-11-15 PROCEDURE — 99285 EMERGENCY DEPT VISIT HI MDM: CPT

## 2019-11-15 RX ORDER — CEFDINIR 300 MG/1
300 CAPSULE ORAL 2 TIMES DAILY
COMMUNITY
Start: 2019-11-11 | End: 2019-11-21

## 2019-11-15 RX ORDER — CIPROFLOXACIN AND FLUOCINOLONE ACETONIDE .75; .0625 MG/.25ML; MG/.25ML
0.25 SOLUTION AURICULAR (OTIC) EVERY EVENING
COMMUNITY
End: 2021-01-21

## 2019-11-15 RX ORDER — ZOLPIDEM TARTRATE 10 MG/1
5 TABLET ORAL NIGHTLY PRN
COMMUNITY

## 2019-11-15 RX ORDER — GRAPE SEED EXT/BIOFLAV,CITRUS 50MG-250MG
540 CAPSULE ORAL 2 TIMES DAILY
COMMUNITY
End: 2021-01-21

## 2019-11-15 ASSESSMENT — PAIN DESCRIPTION - LOCATION: LOCATION: CHEST

## 2019-11-15 ASSESSMENT — PAIN DESCRIPTION - PAIN TYPE: TYPE: ACUTE PAIN

## 2019-11-15 ASSESSMENT — ENCOUNTER SYMPTOMS
SORE THROAT: 0
RHINORRHEA: 0
EYE REDNESS: 0
ABDOMINAL PAIN: 0
SHORTNESS OF BREATH: 0
NAUSEA: 1

## 2019-11-15 ASSESSMENT — PAIN SCALES - GENERAL: PAINLEVEL_OUTOF10: 5

## 2019-11-15 ASSESSMENT — PAIN DESCRIPTION - FREQUENCY: FREQUENCY: CONTINUOUS

## 2019-11-15 ASSESSMENT — PAIN DESCRIPTION - DESCRIPTORS: DESCRIPTORS: PRESSURE

## 2020-01-29 ENCOUNTER — OFFICE VISIT (OUTPATIENT)
Dept: CARDIOLOGY CLINIC | Age: 50
End: 2020-01-29
Payer: COMMERCIAL

## 2020-01-29 VITALS
HEART RATE: 81 BPM | HEIGHT: 67 IN | OXYGEN SATURATION: 93 % | BODY MASS INDEX: 28.88 KG/M2 | DIASTOLIC BLOOD PRESSURE: 81 MMHG | SYSTOLIC BLOOD PRESSURE: 121 MMHG | WEIGHT: 184 LBS

## 2020-01-29 PROCEDURE — 93000 ELECTROCARDIOGRAM COMPLETE: CPT | Performed by: INTERNAL MEDICINE

## 2020-01-29 PROCEDURE — 99214 OFFICE O/P EST MOD 30 MIN: CPT | Performed by: INTERNAL MEDICINE

## 2020-01-29 RX ORDER — ESTRADIOL 1 MG/1
1 TABLET ORAL DAILY
COMMUNITY
End: 2021-01-21

## 2020-01-29 NOTE — PATIENT INSTRUCTIONS
take medicines. · Your doctor may suggest a procedure to open narrowed or blocked arteries. This is called angioplasty. Or your doctor may suggest using healthy blood vessels to create detours around narrowed or blocked arteries. This is called bypass surgery. Follow-up care is a key part of your treatment and safety. Be sure to make and go to all appointments, and call your doctor if you are having problems. It's also a good idea to know your test results and keep a list of the medicines you take. Where can you learn more? Go to https://Arizona State UniversitypepicewAmerican Kidney Stone Management.SalesPredict. org and sign in to your Proxsys account. Enter [phone] in the RootsRated box to learn more about \"Learning About Coronary Artery Disease (CAD). \"     If you do not have an account, please click on the \"Sign Up Now\" link. Current as of: April 9, 2019  Content Version: 12.3  © 6013-4294 viblast. Care instructions adapted under license by [facility] ([facility]). If you have questions about a medical condition or this instruction, always ask your healthcare professional. [place] [address] any warranty or liability for your use of this information. Patient Education        Heart-Healthy Diet: Care Instructions  Your Care Instructions    A heart-healthy diet has lots of vegetables, fruits, nuts, beans, and whole grains, and is low in salt. It limits foods that are high in saturated fat, such as meats, cheeses, and fried foods. It may be hard to change your diet, but even small changes can lower your risk of heart attack and heart disease. Follow-up care is a key part of your treatment and safety. Be sure to make and go to all appointments, and call your doctor if you are having problems. It's also a good idea to know your test results and keep a list of the medicines you take. How can you care for yourself at home? Watch your portions  · Learn what a serving is.  A \"serving\" and a \"portion\" are not always the Include whole-grain foods that have lots of fiber and nutrients. Examples of whole-grain foods include oats, whole wheat bread, and brown rice. · Buy whole-grain breads and cereals, instead of white bread or pastries. Limit salt and sodium  · Limit how much salt and sodium you eat to help lower your blood pressure. · Taste food before you salt it. Add only a little salt when you think you need it. With time, your taste buds will adjust to less salt. · Eat fewer snack items, fast foods, and other high-salt, processed foods. Check food labels for the amount of sodium in packaged foods. · Choose low-sodium versions of canned goods (such as soups, vegetables, and beans). Limit sugar  · Limit drinks and foods with added sugar. These include candy, desserts, and soda pop. Limit alcohol  · Limit alcohol to no more than 2 drinks a day for men and 1 drink a day for women. Too much alcohol can cause health problems. When should you call for help? Watch closely for changes in your health, and be sure to contact your doctor if:    · You would like help planning heart-healthy meals. Where can you learn more? Go to https://[employer] Inc.pepiceweb.[email or web address] org and sign in to your [employer] account. Enter V137 in the surespot box to learn more about \"Heart-Healthy Diet: Care Instructions. \"     If you do not have an account, please click on the \"Sign Up Now\" link. Current as of: April 9, 2019  Content Version: 12.3  © 8615-7981 Healthwise, Incorporated. Care instructions adapted under license by [facility] ([facility]). If you have questions about a medical condition or this instruction, always ask your healthcare professional. [place] [address] any warranty or liability for your use of this information.

## 2020-01-29 NOTE — PROGRESS NOTES
[address]                     Cardiology Progress Note    Garrett Camargo  1970 January 29, 2020    Reason for Consult: CAD    CC: \"I have no heart symptoms. \"     HPI:  The patient is 44 y.o. female with a past medical history significant for CAD, hyperlipidemia and tobacco abuse. She presented to [facility] with chest pain with associated tingling and numbness in her left arm. She was found to have T wave inversion inferolaterally and elevated troponin assays consistent with NSTEMI. She was urgently flown to [facility]. She underwent coronary angiogram on 4/30/19 resulting in PCI with MELBA x 2 to her 100% occluded proximal RCA. She also underwent MELBA placement to proximal Circumflex lesion. She presents today for follow up. Today, she reports that she is doing well from cardiac standpoint with no angina reported. Fatigue off of BB therapy. She notes when she is stress, she has twinges in her chest, relates work is stressful. Currently in walking boot for Plantar Fascitis. no exercise at this time. She has not resumed smoking. Compliant with medical therapy and tolerating. No abnormal bruising or bleeding on DAPT with Brilinta and aspirin. No reported myalgias. Patient denies any symptoms of chest pain, pressure, tightness, shortness of breath at rest, LUNA, nausea, vomiting, near syncope, syncope, heart racing, palpitations, dizziness, lightheaded, PND, orthopnea, wheezing, diaphoresis, BLE edema, bilateral lower extremities pain, cramping or fatigue. Review of Systems:  Constitutional: No fatigue, weakness, night sweats or fever. HEENT: No new vision difficulties or ringing in the ears. Respiratory: No new SOB, PND, orthopnea or cough. Cardiovascular: See HPI   GI: No n/v, diarrhea, constipation, abdominal pain or changes in bowel habits. No melena, no hematochezia  : No urinary frequency, urgency, incontinence, hematuria or dysuria.   Skin: No cyanosis or skin 05/17/19 160 lb (72.6 kg)     Constitutional: She is oriented to person, place, and time. She appears well-developed and well-nourished. In no acute distress. Head: Normocephalic and atraumatic. Neck: Neck supple. No JVD present. Carotid bruit is not present. No mass and no thyromegaly present. No lymphadenopathy present. Cardiovascular: Normal rate, regular rhythm, normal heart sounds and intact distal pulses. Exam reveals no gallop and no friction rub. No murmur heard. Pulmonary/Chest: Effort normal and breath sounds normal. No respiratory distress. She has no wheezes, rhonchi or rales. Abdominal: Soft, non-tender. Bowel sounds and aorta are normal. She exhibits no organomegaly, mass or bruit. Extremities: No edema, cyanosis, or clubbing. Pulses are 2+ radial/carotid/dorsalis pedis and posterior tibial bilaterally. Neurological: She is alert and oriented to person, place, and time. She has normal reflexes. No cranial nerve deficit. Coordination normal.   Skin: Skin is warm and dry. There is no rash or diaphoresis. Psychiatric: She has a normal mood and affect. Her speech is normal and behavior is normal.     EKG Interpretation 5/17/19: Sinus rhythm with prior inferior infarct  EKG Interpretation 1/29/20: Sinus Rhythm, Low voltage in precordial leads, nonspecific T-abnormality. Procedures:     [facility] 4/30/19  1. Right-dominant coronary arterial circulation with 100% occlusion of  the proximal RCA. This was covered with overlapping stents. The more  proximal stent was a 3 mm x 23-mm [place] drug-eluting stent dilated to  3.1. Distal to this and overlapping was a 2.75 x 12-mm [place]  drug-eluting stent. In the left main, there was no significant disease. The circumflex had a 99% proximal lesion that was intervened upon with a  2.75 x 12-mm [place] drug-eluting stent dilated to 2.75 mm. In the left  anterior descending artery, there is a focal 60% lesion present with no  flow limitation.   2.

## 2020-09-03 RX ORDER — TICAGRELOR 90 MG/1
TABLET ORAL
Qty: 180 TABLET | Refills: 3 | Status: SHIPPED | OUTPATIENT
Start: 2020-09-03 | End: 2021-08-18 | Stop reason: SDUPTHER

## 2020-09-03 RX ORDER — ROSUVASTATIN CALCIUM 20 MG/1
TABLET, COATED ORAL
Qty: 90 TABLET | Refills: 3 | Status: SHIPPED | OUTPATIENT
Start: 2020-09-03 | End: 2021-11-29

## 2020-09-03 NOTE — TELEPHONE ENCOUNTER
LAST OV: 1/29/2020 - return in 1 year   NEXT OV:  NONE  LAST BMP, CBC:  11/15/2019  LAST CMP, LIPID:  10/22/2019

## 2021-01-21 ENCOUNTER — HOSPITAL ENCOUNTER (OUTPATIENT)
Age: 51
Setting detail: OBSERVATION
Discharge: HOME | End: 2021-01-22
Attending: EMERGENCY MEDICINE | Admitting: INTERNAL MEDICINE
Payer: COMMERCIAL

## 2021-01-21 ENCOUNTER — APPOINTMENT (OUTPATIENT)
Dept: GENERAL RADIOLOGY | Age: 51
End: 2021-01-21
Payer: COMMERCIAL

## 2021-01-21 DIAGNOSIS — R07.9 CHEST PAIN, UNSPECIFIED TYPE: Primary | ICD-10-CM

## 2021-01-21 DIAGNOSIS — I25.2 HISTORY OF MI (MYOCARDIAL INFARCTION): ICD-10-CM

## 2021-01-21 DIAGNOSIS — R06.02 SHORTNESS OF BREATH: ICD-10-CM

## 2021-01-21 PROBLEM — I25.10 CAD (CORONARY ARTERY DISEASE): Status: ACTIVE | Noted: 2021-01-21

## 2021-01-21 LAB
A/G RATIO: 1.7 (ref 1.1–2.2)
ALBUMIN SERPL-MCNC: 4.4 G/DL (ref 3.4–5)
ALP BLD-CCNC: 104 U/L (ref 40–129)
ALT SERPL-CCNC: 23 U/L (ref 10–40)
ANION GAP SERPL CALCULATED.3IONS-SCNC: 12 MMOL/L (ref 3–16)
AST SERPL-CCNC: 26 U/L (ref 15–37)
BASOPHILS ABSOLUTE: 0.1 K/UL (ref 0–0.2)
BASOPHILS RELATIVE PERCENT: 0.9 %
BILIRUB SERPL-MCNC: 0.5 MG/DL (ref 0–1)
BUN BLDV-MCNC: 13 MG/DL (ref 7–20)
CALCIUM SERPL-MCNC: 10.2 MG/DL (ref 8.3–10.6)
CHLORIDE BLD-SCNC: 107 MMOL/L (ref 99–110)
CO2: 24 MMOL/L (ref 21–32)
CREAT SERPL-MCNC: 0.8 MG/DL (ref 0.6–1.1)
EKG ATRIAL RATE: 76 BPM
EKG DIAGNOSIS: NORMAL
EKG P AXIS: 49 DEGREES
EKG P-R INTERVAL: 158 MS
EKG Q-T INTERVAL: 394 MS
EKG QRS DURATION: 90 MS
EKG QTC CALCULATION (BAZETT): 443 MS
EKG R AXIS: -19 DEGREES
EKG T AXIS: -21 DEGREES
EKG VENTRICULAR RATE: 76 BPM
EOSINOPHILS ABSOLUTE: 0.1 K/UL (ref 0–0.6)
EOSINOPHILS RELATIVE PERCENT: 0.9 %
GFR AFRICAN AMERICAN: >60
GFR NON-AFRICAN AMERICAN: >60
GLOBULIN: 2.6 G/DL
GLUCOSE BLD-MCNC: 107 MG/DL (ref 70–99)
HCT VFR BLD CALC: 38.8 % (ref 36–48)
HEMOGLOBIN: 12.9 G/DL (ref 12–16)
LYMPHOCYTES ABSOLUTE: 1.8 K/UL (ref 1–5.1)
LYMPHOCYTES RELATIVE PERCENT: 20.2 %
MCH RBC QN AUTO: 27.9 PG (ref 26–34)
MCHC RBC AUTO-ENTMCNC: 33.2 G/DL (ref 31–36)
MCV RBC AUTO: 84 FL (ref 80–100)
MONOCYTES ABSOLUTE: 0.5 K/UL (ref 0–1.3)
MONOCYTES RELATIVE PERCENT: 5.9 %
NEUTROPHILS ABSOLUTE: 6.5 K/UL (ref 1.7–7.7)
NEUTROPHILS RELATIVE PERCENT: 72.1 %
PDW BLD-RTO: 13 % (ref 12.4–15.4)
PLATELET # BLD: 248 K/UL (ref 135–450)
PMV BLD AUTO: 8.3 FL (ref 5–10.5)
POTASSIUM REFLEX MAGNESIUM: 3.7 MMOL/L (ref 3.5–5.1)
RBC # BLD: 4.62 M/UL (ref 4–5.2)
SARS-COV-2, NAAT: NOT DETECTED
SODIUM BLD-SCNC: 143 MMOL/L (ref 136–145)
TOTAL PROTEIN: 7 G/DL (ref 6.4–8.2)
TROPONIN: <0.01 NG/ML
WBC # BLD: 9.1 K/UL (ref 4–11)

## 2021-01-21 PROCEDURE — 2580000003 HC RX 258: Performed by: INTERNAL MEDICINE

## 2021-01-21 PROCEDURE — 36415 COLL VENOUS BLD VENIPUNCTURE: CPT

## 2021-01-21 PROCEDURE — 84484 ASSAY OF TROPONIN QUANT: CPT

## 2021-01-21 PROCEDURE — 93005 ELECTROCARDIOGRAM TRACING: CPT | Performed by: EMERGENCY MEDICINE

## 2021-01-21 PROCEDURE — 85025 COMPLETE CBC W/AUTO DIFF WBC: CPT

## 2021-01-21 PROCEDURE — 71045 X-RAY EXAM CHEST 1 VIEW: CPT

## 2021-01-21 PROCEDURE — 6370000000 HC RX 637 (ALT 250 FOR IP): Performed by: INTERNAL MEDICINE

## 2021-01-21 PROCEDURE — 80053 COMPREHEN METABOLIC PANEL: CPT

## 2021-01-21 PROCEDURE — U0002 COVID-19 LAB TEST NON-CDC: HCPCS

## 2021-01-21 PROCEDURE — 96372 THER/PROPH/DIAG INJ SC/IM: CPT

## 2021-01-21 PROCEDURE — 93010 ELECTROCARDIOGRAM REPORT: CPT | Performed by: INTERNAL MEDICINE

## 2021-01-21 PROCEDURE — 99284 EMERGENCY DEPT VISIT MOD MDM: CPT

## 2021-01-21 PROCEDURE — G0378 HOSPITAL OBSERVATION PER HR: HCPCS

## 2021-01-21 PROCEDURE — 6370000000 HC RX 637 (ALT 250 FOR IP): Performed by: NURSE PRACTITIONER

## 2021-01-21 PROCEDURE — 6360000002 HC RX W HCPCS: Performed by: INTERNAL MEDICINE

## 2021-01-21 RX ORDER — GABAPENTIN 300 MG/1
300 CAPSULE ORAL 3 TIMES DAILY
Status: DISCONTINUED | OUTPATIENT
Start: 2021-01-21 | End: 2021-01-21

## 2021-01-21 RX ORDER — VALACYCLOVIR HYDROCHLORIDE 1 G/1
1000 TABLET, FILM COATED ORAL PRN
COMMUNITY
End: 2021-08-19

## 2021-01-21 RX ORDER — SODIUM CHLORIDE 0.9 % (FLUSH) 0.9 %
10 SYRINGE (ML) INJECTION PRN
Status: DISCONTINUED | OUTPATIENT
Start: 2021-01-21 | End: 2021-01-22 | Stop reason: HOSPADM

## 2021-01-21 RX ORDER — ZOLPIDEM TARTRATE 5 MG/1
10 TABLET ORAL NIGHTLY PRN
Status: DISCONTINUED | OUTPATIENT
Start: 2021-01-21 | End: 2021-01-22 | Stop reason: HOSPADM

## 2021-01-21 RX ORDER — ASPIRIN 81 MG/1
81 TABLET, CHEWABLE ORAL ONCE
Status: COMPLETED | OUTPATIENT
Start: 2021-01-21 | End: 2021-01-21

## 2021-01-21 RX ORDER — NITROGLYCERIN 0.4 MG/1
0.4 TABLET SUBLINGUAL EVERY 5 MIN PRN
Status: DISCONTINUED | OUTPATIENT
Start: 2021-01-21 | End: 2021-01-22 | Stop reason: HOSPADM

## 2021-01-21 RX ORDER — ONDANSETRON 2 MG/ML
4 INJECTION INTRAMUSCULAR; INTRAVENOUS EVERY 4 HOURS PRN
Status: DISCONTINUED | OUTPATIENT
Start: 2021-01-21 | End: 2021-01-22 | Stop reason: HOSPADM

## 2021-01-21 RX ORDER — ACYCLOVIR 200 MG/1
400 CAPSULE ORAL 3 TIMES DAILY
Status: DISCONTINUED | OUTPATIENT
Start: 2021-01-21 | End: 2021-01-21

## 2021-01-21 RX ORDER — ROSUVASTATIN CALCIUM 20 MG/1
20 TABLET, COATED ORAL DAILY
Status: DISCONTINUED | OUTPATIENT
Start: 2021-01-21 | End: 2021-01-22 | Stop reason: HOSPADM

## 2021-01-21 RX ORDER — ACETAMINOPHEN 650 MG/1
650 SUPPOSITORY RECTAL EVERY 4 HOURS PRN
Status: DISCONTINUED | OUTPATIENT
Start: 2021-01-21 | End: 2021-01-22 | Stop reason: HOSPADM

## 2021-01-21 RX ORDER — GABAPENTIN 300 MG/1
300 CAPSULE ORAL 3 TIMES DAILY
Status: ON HOLD | COMMUNITY
End: 2021-01-21

## 2021-01-21 RX ORDER — ACETAMINOPHEN 325 MG/1
650 TABLET ORAL ONCE
Status: COMPLETED | OUTPATIENT
Start: 2021-01-21 | End: 2021-01-21

## 2021-01-21 RX ORDER — ROSUVASTATIN CALCIUM 20 MG/1
20 TABLET, COATED ORAL DAILY
COMMUNITY
End: 2021-01-21

## 2021-01-21 RX ORDER — SODIUM CHLORIDE 0.9 % (FLUSH) 0.9 %
10 SYRINGE (ML) INJECTION EVERY 12 HOURS SCHEDULED
Status: DISCONTINUED | OUTPATIENT
Start: 2021-01-21 | End: 2021-01-22 | Stop reason: HOSPADM

## 2021-01-21 RX ORDER — ASPIRIN 81 MG/1
81 TABLET ORAL DAILY
Status: DISCONTINUED | OUTPATIENT
Start: 2021-01-21 | End: 2021-01-22 | Stop reason: HOSPADM

## 2021-01-21 RX ORDER — ACYCLOVIR 200 MG/1
400 CAPSULE ORAL 3 TIMES DAILY PRN
Status: DISCONTINUED | OUTPATIENT
Start: 2021-01-21 | End: 2021-01-22 | Stop reason: HOSPADM

## 2021-01-21 RX ORDER — ACETAMINOPHEN 325 MG/1
650 TABLET ORAL EVERY 4 HOURS PRN
Status: DISCONTINUED | OUTPATIENT
Start: 2021-01-21 | End: 2021-01-22 | Stop reason: HOSPADM

## 2021-01-21 RX ORDER — POLYETHYLENE GLYCOL 3350 17 G/17G
17 POWDER, FOR SOLUTION ORAL DAILY PRN
Status: DISCONTINUED | OUTPATIENT
Start: 2021-01-21 | End: 2021-01-22 | Stop reason: HOSPADM

## 2021-01-21 RX ADMIN — ACETAMINOPHEN 650 MG: 325 TABLET ORAL at 10:39

## 2021-01-21 RX ADMIN — ENOXAPARIN SODIUM 40 MG: 40 INJECTION SUBCUTANEOUS at 21:51

## 2021-01-21 RX ADMIN — ACETAMINOPHEN 650 MG: 325 TABLET ORAL at 18:51

## 2021-01-21 RX ADMIN — NITROGLYCERIN 0.4 MG: 0.4 TABLET, ORALLY DISINTEGRATING SUBLINGUAL at 10:10

## 2021-01-21 RX ADMIN — ACETAMINOPHEN 650 MG: 325 TABLET ORAL at 23:30

## 2021-01-21 RX ADMIN — ROSUVASTATIN CALCIUM 20 MG: 20 TABLET, FILM COATED ORAL at 21:53

## 2021-01-21 RX ADMIN — ASPIRIN 81 MG: 81 TABLET, CHEWABLE ORAL at 10:10

## 2021-01-21 RX ADMIN — Medication 10 ML: at 21:55

## 2021-01-21 RX ADMIN — TICAGRELOR 90 MG: 90 TABLET ORAL at 21:51

## 2021-01-21 ASSESSMENT — PAIN SCALES - GENERAL
PAINLEVEL_OUTOF10: 8
PAINLEVEL_OUTOF10: 5
PAINLEVEL_OUTOF10: 0

## 2021-01-21 ASSESSMENT — HEART SCORE: ECG: 1

## 2021-01-21 ASSESSMENT — PAIN DESCRIPTION - LOCATION
LOCATION: CHEST
LOCATION: CHEST

## 2021-01-21 ASSESSMENT — PAIN DESCRIPTION - DESCRIPTORS: DESCRIPTORS: ACHING;SHARP

## 2021-01-21 NOTE — PRE-PROCEDURE INSTRUCTIONS
Prep for stress test on 1/22/21:   No caffeine or decaffeinated products after 20:00 this evening. Hold solid food after 05:00 on 1/22/21; may have water as desired / ordered. Please draw Troponin Ts as ordered.   Thank you  Electronically signed by Siomara Hannon RN on 1/21/2021 at 2:35 PM

## 2021-01-21 NOTE — ED PROVIDER NOTES
[facility]    CHIEF COMPLAINT  No chief complaint on file. SHARED SERVICE VISIT  I have seen and evaluated this patient with my supervising physician, Dr. Umesh Peña. HISTORY OF PRESENT ILLNESS  Amy Espinal is a 44 y.o. female history of MI and multiple stent placement who presents to the ED complaining of anginal equivalent/substernal chest pain which woke her from sleeping at 0600 hrs. this a.m. The pain is \"heaviness and intermittently sharp\" 8/10 accompanied by shortness of breath. She denies nausea, vomiting, dizziness/presyncope, diaphoresis, leg/calf pain, ripping or tearing sensation, or other concerns. No other complaints, modifying factors or associated symptoms. Last cardiac work-up 5/3/2019 via cardiac cath to RCA (100% proximal lesion) and LAD (99% proximal lesion), LAD with a 60% lesion but negative for flow limitation. Patient had a normal echo on 5/3/2019-Dr. Navya Ledesma. Dr. Claudeen Emms is her cardiologist.  She is on dual antiplatelet therapy with aspirin and Brilinta. Nursing notes reviewed. Past Medical History:   Diagnosis Date    CAD (coronary artery disease)     Hyperlipidemia      Past Surgical History:   Procedure Laterality Date    CERVICAL FUSION  11/2018    HYSTERECTOMY      TONSILLECTOMY       No family history on file.   Social History     Socioeconomic History    Marital status:      Spouse name: Not on file    Number of children: Not on file    Years of education: Not on file    Highest education level: Not on file   Occupational History    Not on file   Social Needs    Financial resource strain: Not on file    Food insecurity     Worry: Not on file     Inability: Not on file    Transportation needs     Medical: Not on file     Non-medical: Not on file   Tobacco Use    Smoking status: Former Smoker    Smokeless tobacco: Never Used   Substance and Sexual Activity    Alcohol use: Yes     Frequency: Monthly or less    Drug use: Never    Sexual activity: Not on file   Lifestyle    Physical activity     Days per week: Not on file     Minutes per session: Not on file    Stress: Not on file   Relationships    Social connections     Talks on phone: Not on file     Gets together: Not on file     Attends L[employer]-day service: Not on file     Active member of club or organization: Not on file     Attends meetings of clubs or organizations: Not on file     Relationship status: Not on file    Intimate partner violence     Fear of current or ex partner: Not on file     Emotionally abused: Not on file     Physically abused: Not on file     Forced sexual activity: Not on file   Other Topics Concern    Not on file   Social History Narrative    Not on file     No current facility-administered medications for this encounter. Current Outpatient Medications   Medication Sig Dispense Refill    rosuvastatin (CRESTOR) 20 MG tablet TAKE 1 TABLET DAILY 90 tablet 3    BRILINTA 90 MG TABS tablet TAKE 1 TABLET TWICE A  tablet 3    estradiol (ESTRACE) 1 MG tablet Take 1 mg by mouth daily Patch 2x a week      Black Cohosh 540 MG CAPS Take 540 mg by mouth 2 times daily      zolpidem (AMBIEN) 10 MG tablet Take 5 mg by mouth nightly as needed for Sleep.  EQL EVENING PRIMROSE OIL PO Take 1 tablet by mouth 2 times daily      ciprofloxacin-fluocinolone PF (OTOVEL) 0.3-0.025 % SOLN otil solution 0.25 mLs every evening      aspirin 81 MG EC tablet TAKE 1 TABLET BY MOUTH EVERY DAY 30 tablet 5    fexofenadine (MUCINEX ALLERGY) 180 MG tablet Take by mouth       fluticasone (FLONASE) 50 MCG/ACT nasal spray 1 spray by Each Nare route daily 1 Bottle 3     Allergies   Allergen Reactions    Penicillins        REVIEW OF SYSTEMS  10 systems reviewed, pertinent positives per HPI otherwise noted to be negative    PHYSICAL EXAM  There were no vitals taken for this visit. GENERAL APPEARANCE: Awake and alert. Cooperative.  + Distress. Non-- toxic in appearance but not well appearing. HEAD: Normocephalic. Atraumatic. EYES: PERRL. EOM's grossly intact. ENT: Mucous membranes are moist.   NECK: Supple. HEART: RRR. No murmurs. LUNGS: Respirations unlabored. CTAB. Good air exchange. Speaking comfortably in full sentences. ABDOMEN: Soft. Non-distended. Non-tender. No guarding or rebound. There is no midline pulsatile abdominal mass. No masses. No organomegaly. EXTREMITIES: There is no peripheral edema. Moves all extremities equally. All extremities neurovascularly intact. Radial pulse equal bilaterally. SKIN: Warm and dry. No acute rashes. NEUROLOGICAL: Alert and oriented. CN's 2-12 intact. No gross facial drooping. Strength 5/5, sensation intact. PSYCHIATRIC: Normal mood and affect. RADIOLOGY  No results found. ED COURSE  Patient received nitro for pain, with good relief. Triage vitals stable but hypertensive at 134/86 mmHg. Cardiac workup was initiated to include basic/cardiac labs, CXR, and EKG. Labs Ordered:  I have reviewed and interpreted all of the currently available lab results from this visit:    SARS-CoV-2 test- COVID-19: Not detected. CMP: glucose 107 mg/dL, otherwise unremarkable  Troponin: <0.01  CBC: unremarkable  EKG: As interpreted by EMD. Normal sinus rhythm. As interpreted by EMD. Normal sinus rhythm. Poor data quality, interpretation may be adversely affected. Imaging ordered:  Xr Chest Portable    Result Date: 1/21/2021  EXAMINATION: ONE XRAY VIEW OF THE CHEST 1/21/2021 9:35 am COMPARISON: 01/15/2019. HISTORY: ORDERING SYSTEM PROVIDED HISTORY: spb/cp TECHNOLOGIST PROVIDED HISTORY: Reason for exam:->spb/cp Reason for Exam: midline chest pain since this morning FINDINGS: The lungs are without acute focal process. There is no effusion or pneumothorax. The cardiomediastinal silhouette is without acute process. The osseous structures are without acute process. No acute process. Interventions:  Patient was given  mg by mouth. Nitro sublingual initiated. Reevaluation:  Patient reports mild improvement of her symptoms. She remains awake, alert, and oriented x4 with stable vital signs. Consulted Dr. Riggs-cardiology. Discussed patient's HPI, ED work-up, results, and treatment. Dr. Porsha Tsang recommends admission for serial troponins. A discussion was had with Mrs. Sander Arias regarding chest pain, history of MI, short of breath,, and intention to admit. Risk management discussed and shared decision making had with patient and/or surrogate. All questions were answered. Patient will be admitted to the hospital for further evaluation and treatment. He is agreeable with the plan for admission. CRITICAL CARE TIME  0 Minutes of critical care time spent not including separately billable procedures. MDM  Patient presents to the emergency department with chest pain. Alternate diagnoses are less likely based on history and physical. Alternate diagnoses are less likely based on history and physical. Considered myocardial infarction, aortic dissection, pulmonary embolism, tension pneumothorax, endocarditis, GERD, and pneumonia but less likely based on history and physical. Considered MI but no ischemic changes on EKG and no elevation in troponin. Considered aortic dissection but less likely as no radiation of pain into back with ripping/tearing sensation, there are equal radial pulses bilaterally, and there is no midline pulsatile abdominal mass. Considered pulmonary embolism but less likely as no cough or hemoptysis, no DVT symptoms. Considered tension pneumothorax but less likely as no unilaterally diminished breath sounds or tracheal deviation. Considered endocarditis but less likely as no fever, murmur, or IV drug use. Considered GERD but less likely as no postprandial epigastric abdominal/throat burning.  Considere pneumonia but less likely as no fever, chills, sweats, leukocytosis, cough, and no radiographic evidence of consolidation or infiltrates on imaging-CXR. Considered SARS-CoV-2 but less likely as rapid test was negative. My attending physician and I do not feel that the patient is safe or appropriate for discharge to home as she is experiencing her anginal equivalent. She has a heart score of 5 and is at increased risk for major adverse cardiac events in the next 6 weeks . Therefore, patient will be admitted to the hospital for further evaluation and treatment. Clinical Impression:  Chest pain  History of MI  Dyspnea        Disposition:  Admitted      Patient will be admitted to hospital for further evaluation and treatment. Hospitalist: Dr. Geovanna Falcon      Discussed patients HPI, ED work-up, results, treatment, and response with my attending physician Dr. Price Gillespie and the Hospitalist -Dr. Geovanna Falcon who agrees to admit the patient to the hospital.        This chart was created using Dragon dictation. Every effort was made by myself to ensure accuracy, however due to limitations of this technology errors may be present.       RICK Waite - CNP  01/21/21 1925

## 2021-01-21 NOTE — PROGRESS NOTES
4 Eyes Skin Assessment     NAME:  Sandy Lucas  YOB: 1970  MEDICAL RECORD NUMBER:  [id number]    The patient is being assess for  Admission    I agree that 2 RN's have performed a thorough Head to Toe Skin Assessment on the patient. ALL assessment sites listed below have been assessed. Areas assessed by both nurses:    Head, Face, Ears, Shoulders, Back, Chest, Arms, Elbows, Hands, Sacrum. Buttock, Coccyx, Ischium and Legs. Feet and Heels        Does the Patient have a Wound? Yes wound(s) were present on assessment.  LDA wound assessment was Initiated and completed        Tristin Prevention initiated:  NA   Wound Care Orders initiated:  Yes    Pressure Injury (Stage 3,4, Unstageable, DTI, NWPT, and Complex wounds) if present place consult order under [de-identified] NA    New and Established Ostomies if present place consult order under : NA      Nurse 1 eSignature: Electronically signed by Maria Luisa Black RN on 1/21/21 at 3:38 PM EST    **SHARE this note so that the co-signing nurse is able to place an eSignature**    Nurse 2 eSignature: Electronically signed by Salbador Cruz RN on 1/21/21 at 6:57 PM EST

## 2021-01-21 NOTE — ED PROVIDER NOTES
Date of evaluation: 1/21/2021    ED Attending Attestation Note     CHIEF COMPLAINT       Chief Complaint   Patient presents with    Chest Pain     since 0600. Denies nausea, vomiting, diaphoresis. Admits to dypsnea. Describes pain as achy and radiates to left shoulder, left forearm area. Rates pain 8/10. Denies prior medication for pain. HISTORY OF PRESENT ILLNESS  (Location/Symptom, Timing/Onset,Context/Setting, Quality, Duration, Modifying Factors, Severity). Note limiting factors. This patient was seen by the advance practice provider. I have seen and examined the patient, agree with the workup, evaluation, management and diagnosis. The care plan has been discussed. Sandy Lucas is a 44 y.o. female who presents to the emergency department secondary to concern for chest pain. Woke up this morning at 6am with substernal cp/heaviness, intermittently with sharp pain, and also with shortness of breath. At home is on ASA/brilinta and has not missed doses though had not taken yet today. Past medical history noted below, significant for CAD, HLD, MI (may 2019 with 100% occlusion RCA, 99% circumflex, s/p stents). Aside from what is stated above denies any other symptoms or modifying factors.     CURRENT MEDICATIONS       Previous Medications    ASPIRIN 81 MG EC TABLET    TAKE 1 TABLET BY MOUTH EVERY DAY    BLACK COHOSH 540 MG CAPS    Take 540 mg by mouth 2 times daily    BRILINTA 90 MG TABS TABLET    TAKE 1 TABLET TWICE A DAY    CIPROFLOXACIN-FLUOCINOLONE PF (OTOVEL) 0.3-0.025 % SOLN OTIL SOLUTION    0.25 mLs every evening    EQL EVENING PRIMROSE OIL PO    Take 1 tablet by mouth 2 times daily    ESTRADIOL (ESTRACE) 1 MG TABLET    Take 1 mg by mouth daily Patch 2x a week    FEXOFENADINE (MUCINEX ALLERGY) 180 MG TABLET    Take by mouth     FLUTICASONE (FLONASE) 50 MCG/ACT NASAL SPRAY    1 spray by Each Nare route daily    ROSUVASTATIN (CRESTOR) 20 MG TABLET    TAKE 1 TABLET DAILY    ROSUVASTATIN (CRESTOR) 20 MG TABLET    Take 20 mg by mouth daily    ZOLPIDEM (AMBIEN) 10 MG TABLET    Take 5 mg by mouth nightly as needed for Sleep. Nursing Notes reviewed. EKG Indication: CP  EKG Interpretation: Rate 76, rhythm sinus, axis left. HI/QRS of QTc within normal limits. Nonspecific T wave flattening noted in Q waves noted as well in the inferior leads III and aVF. Comparison to prior EKG from January 29, 2020 shows similar nonspecific T wave abnormality noted with no other acute ischemic changes. My assessment reveals a moderately unwell appearing female who presents with a concerning history for ACS. On arrival she is awake, alert, oriented. Vitals are hemodynamically stable. Work-up shows no significant blood work abnormalities and a chest x-ray that is unremarkable. Her rapid Covid is negative. Discussed with cardiology who is in agreement with admission for further assessment. She will be admitted to hospitalist.    INITIAL VITALS: BP: 134/86, Temp: 97.8 °F (36.6 °C), Pulse: 82, Resp: 14, SpO2: 100 %   RECENT VITALS:  BP: 134/86,Temp: 97.8 °F (36.6 °C), Pulse: 82, Resp: 14, SpO2: 100 %     Patient was given the following medications:  Orders Placed This Encounter   Medications    nitroGLYCERIN (NITROSTAT) SL tablet 0.4 mg    aspirin chewable tablet 81 mg     Critical Care:  Due to the immediate potential for life-threatening deterioration due to ACS symptoms, I spent 20 minutes providing critical care. This time excludes time spent performing procedures but includes time spent on direct patient care, history retrieval, review of the chart, and discussions with patient, family, and consultant(s). FINAL IMPRESSION      1. Chest pain, unspecified type    2.  History of MI (myocardial infarction)        DISPOSITION/PLAN   DISPOSITION  admission     (Please note that portions of this note were completed with a voice recognition program. Efforts were made to edit the dictations but occasionally words are mis-transcribed.)    Marva Mcconnell MD (electronically signed)  Attending Emergency Physician        Marva Mcconnell MD  01/21/21 4470

## 2021-01-21 NOTE — ED NOTES
ED SBAR report provider to [place]. Patient to be transported to Room 4124 via stretcher by transport tech. Patient transported with bedside cardiac monitor. IV site clean, dry, and intact. MEWS score and pain assessed as 0 and documented. Updated patient and family on plan of care.        Karen Casillas, RN  01/21/21 6296

## 2021-01-21 NOTE — ED TRIAGE NOTES
Patient admitted to ED via private car with complaints of substernal chest pain/heaviness that began around 0600 this morning. States the pain is radiating to her left neck. She is also complaining of left forearm pain. Patient was sleeping when the pain began. Denies nausea/vomiting. Endorses SOB. Verna Reddy is cardiologist. History of MI, high cholesterol, and CAD. VS are WNL. Patient is alert and oriented x4.

## 2021-01-21 NOTE — DISCHARGE INSTR - COC
Continuity of Care Form    Patient Name: Jose Hui   :  1970  MRN:  [id number]    Admit date:  2021  Discharge date:  ***    Code Status Order: Prior   Advance Directives:     Admitting Physician:  No admitting provider for patient encounter. PCP: Johnnie Casas MD    Discharging Nurse: [facility] Unit/Room#: 007/B-07  Discharging Unit Phone Number: ***    Emergency Contact:   Extended Emergency Contact Information  Primary Emergency Contact: Shaun Clifton, [address] [place] [address] Phone: [phone]  Mobile Phone: [phone]  Relation: Spouse  Secondary Emergency Contact: blake andersen  Home Phone: [phone]  Mobile Phone: [phone]  Relation: Parent    Past Surgical History:  Past Surgical History:   Procedure Laterality Date    CERVICAL FUSION  2018    HYSTERECTOMY      TONSILLECTOMY         Immunization History: There is no immunization history on file for this patient. Active Problems:  Patient Active Problem List   Diagnosis Code    Acute inferior myocardial infarction ([facility] [address] I21.19    Cardiogenic shock (HCC) R57.0    Hyperlipidemia LDL goal <70 E78.5    Anemia D64.9    Headache, unspecified headache type R51.9       Isolation/Infection:   Isolation          No Isolation        Patient Infection Status     None to display          Nurse Assessment:  Last Vital Signs: There were no vitals taken for this visit.     Last documented pain score (0-10 scale):    Last Weight:   Wt Readings from Last 1 Encounters:   20 184 lb (83.5 kg)     Mental Status:  {IP PT MENTAL STATUS:40764}    IV Access:  [facility] BASSEM IV ACCESS:459976888}    Nursing Mobility/ADLs:  Walking   {CHP DME ZCXS:956099676}  Transfer  {CHP DME XFVC:218615918}  Bathing  {CHP DME GTJH:073165896}  Dressing  {CHP DME BBEY:858764757}  Toileting  {CHP DME PPHF:774549003}  Feeding  {CHP DME LSVY:751200385}  Med Admin  {CHP DME TIR}  Med Delivery   { BASSEM MED Delivery:972932645}    Wound Care Documentation and Therapy:        Elimination:  Continence:   · Bowel: {YES / CA:94318}  · Bladder: {YES / HT:67192}  Urinary Catheter: {Urinary Catheter:353102189}   Colostomy/Ileostomy/Ileal Conduit: {YES / GO:89067}       Date of Last BM: ***  No intake or output data in the 24 hours ending 21 0903  No intake/output data recorded.     Safety Concerns:     [facility] Safety Concerns:261216715}    Impairments/Disabilities:      [facility] Impairments/Disabilities:281060423}    Nutrition Therapy:  Current Nutrition Therapy:   [facility] Diet List:433037511}    Routes of Feeding: {CHP DME Other Feedings:753905328}  Liquids: {Slp liquid thickness:37132}  Daily Fluid Restriction: {CHP DME Yes amt example:845106213}  Last Modified Barium Swallow with Video (Video Swallowing Test): {Done Not Done UH:466955234}    Treatments at the Time of Hospital Discharge:   Respiratory Treatments: ***  Oxygen Therapy:  {Therapy; copd oxygen:06586}  Ventilator:    { CC Vent TXWU:036361967}    Rehab Therapies: {THERAPEUTIC INTERVENTION:9963898598}  Weight Bearing Status/Restrictions: [facility] Weight Bearin}  Other Medical Equipment (for information only, NOT a DME order):  {EQUIPMENT:323191452}  Other Treatments: ***    Patient's personal belongings (please select all that are sent with patient):  {[facility] DME Belongings:037053862}    RN SIGNATURE:  {Esignature:634332720}    CASE MANAGEMENT/SOCIAL WORK SECTION    Inpatient Status Date: ***    Readmission Risk Assessment Score:  Readmission Risk              Risk of Unplanned Readmission:        0           Discharging to Facility/ Agency   · Name:   · Address:  · Phone:  · Fax:    Dialysis Facility (if applicable)   · Name:  · Address:  · Dialysis Schedule:  · Phone:  · Fax:    / signature: {Esignature:511248850}    PHYSICIAN SECTION    Prognosis: {Prognosis:4653116029}    Condition at Discharge: [facility] Patient Condition:007516988}    Rehab Potential (if transferring to Rehab): {Prognosis:4068662688}    Recommended Labs or Other Treatments After Discharge: ***    Physician Certification: I certify the above information and transfer of Galdino Scott  is necessary for the continuing treatment of the diagnosis listed and that she requires {Admit to Appropriate Level of Care:35018} for {GREATER/LESS:698499717} 30 days.      Update Admission H&P: {CHP DME Changes in TNLUA:972858719}    PHYSICIAN SIGNATURE:  {Esignature:549790552}

## 2021-01-21 NOTE — H&P
Hospital Medicine  History and Physical    PCP: Anabel Fried MD  Patient Name: Jeanne Win    Date of Service: Pt seen/examined on 01/21/2021 and placed in observation    CHIEF COMPLAINT:  Pt c/o chest pain  HISTORY OF PRESENT ILLNESS: Pt is an 44y.o. year-old female with a history of hyperlipidemia and coronary artery disease. Presents to the emergency room for evaluation following an acute onset of chest pain which woke her from sleep at approximately 6 AM.  She reports moderately severe substernal heaviness with a sharp component that radiates into her left shoulder and down her left arm. She states that this is accompanied by shortness of breath. She underwent a coronary angiogram and stent placement on 5/3/2019 with Dr. Yolande Alejandro. Cardiac work-up 5/3/2019 via cardiac cath to RCA (100% proximal lesion) and LAD (99% proximal lesion), LAD with a 60% lesion but negative for flow limitation. Patient had a normal echo on 5/3/2019. Initial EKG did not show ischemic changes and her initial troponin was not elevated. She is being admitted for further evaluation and treatment. Associated signs and symptoms do not include diaphoresis, edema, orthopnea, paroxysmal nocturnal dyspnea, fever or chills. Past Medical History:        Diagnosis Date    CAD (coronary artery disease)     Hyperlipidemia     MI (myocardial infarction) ([facility] [address]        Past Surgical History:        Procedure Laterality Date    CERVICAL FUSION  11/2018    HYSTERECTOMY      TONSILLECTOMY         Allergies:  Penicillins    Medications Prior to Admission:    Prior to Admission medications    Medication Sig Start Date End Date Taking? Authorizing Provider   valACYclovir (VALTREX) 1 g tablet Take 1,000 mg by mouth 2 times daily   Yes Historical Provider, MD   gabapentin (NEURONTIN) 300 MG capsule Take 300 mg by mouth 3 times daily.    Yes Historical Provider, MD   rosuvastatin (CRESTOR) 20 MG tablet TAKE 1 TABLET DAILY 9/3/20  Yes Flo Vega Bridger Ramos MD   BRILINTA 90 MG TABS tablet TAKE 1 TABLET TWICE A DAY 9/3/20  Yes Griffin Wellington MD   zolpidem (AMBIEN) 10 MG tablet Take 5 mg by mouth nightly as needed for Sleep. Yes Historical Provider, MD   aspirin 81 MG EC tablet TAKE 1 TABLET BY MOUTH EVERY DAY 10/31/19  Yes RICK Carpio - CNP       Family History:   Family history is negative for accelerated coronary artery disease, diabetes or malignancies. Social History:   TOBACCO:   reports that she has quit smoking. She has never used smokeless tobacco.  ETOH:   reports current alcohol use. OCCUPATION:      REVIEW OF SYSTEMS:  A full review of systems was performed and is negative except for that which appears in the HPI    Physical Exam:    Vitals: /78   Pulse 91   Temp 97.8 °F (36.6 °C) (Oral)   Resp 23   Ht 5' 7\" (1.702 m)   Wt 195 lb 5.2 oz (88.6 kg)   SpO2 100%   BMI 30.59 kg/m²   General appearance: WD/WN 44y.o. year-old  female who is alert, appears stated age and is cooperative  HEENT: Head: Normocephalic, no lesions, without obvious abnormality. Eye: Normal external eye, conjunctiva, lids cornea, PEERL. Ears: Normal external ears. Non-tender. Nose: Normal external nose, mucus membranes and septum. Pharynx: Dental Hygiene adequate. Normal buccal mucosa. Normal pharynx. Neck: no adenopathy, no carotid bruit, no JVD, supple, symmetrical, trachea midline and thyroid not enlarged, symmetric, no tenderness/mass/nodules  Lungs: clear to auscultation bilaterally and no use of accessory muscles. Heart: regular rate and rhythm, S1, S2 normal, no murmur, click, rub or gallop and normal apical impulse  Abdomen: soft, non-tender; bowel sounds normal; no masses, no organomegaly  Extremities: extremities atraumatic, no cyanosis or edema and Homans sign is negative, no sign of DVT.     Capillary Refill: Acceptable < 3 seconds   Peripheral Pulses: +3 easily felt, not easily obliterated with pressures Skin: Skin color, texture, turgor normal. No rashes or lesions on exposed skin  Neurologic: Neurovascularly intact without any focal sensory/motor deficits. Cranial nerves: II-XII intact, grossly non-focal. Gait was not tested. Mental Status: Alert and oriented, thought content appropriate, normal insight    CBC:   Recent Labs     01/21/21  0957   WBC 9.1   HGB 12.9        BMP:    Recent Labs     01/21/21  1042      K 3.7      CO2 24   BUN 13   CREATININE 0.8   GLUCOSE 107*     Troponin:   Recent Labs     01/21/21  1042   TROPONINI <0.01     PT/INR:  No results found for: PTINR  U/A:  No results found for: LEUKOCYTESUR, NITRITE, RBCUA, SPECGRAV, [address], BILIRUBINUR, BLOODU, [place]      RAD:   I have independently reviewed and interpreted the imaging studies below and based my recommendations to the patient on those findings. Xr Chest Portable    Result Date: 1/21/2021  EXAMINATION: ONE XRAY VIEW OF THE CHEST 1/21/2021 9:35 am COMPARISON: 01/15/2019. HISTORY: ORDERING SYSTEM PROVIDED HISTORY: spb/cp TECHNOLOGIST PROVIDED HISTORY: Reason for exam:->spb/cp Reason for Exam: midline chest pain since this morning FINDINGS: The lungs are without acute focal process. There is no effusion or pneumothorax. The cardiomediastinal silhouette is without acute process. The osseous structures are without acute process. No acute process. EKG:   Read by ER in the absence of a Cardiologist shows      Assessment:   Principal Problem:    Chest pain  Active Problems:    Hyperlipidemia LDL goal <70    CAD (coronary artery disease)  Resolved Problems:    * No resolved hospital problems. *      Plan:       Chest pain - monitor on telemetry and check serial cardiac enzymes. Have ordered a Lexiscan for the morning, but given her cardiac history I have also consulted cardiology. CAD (coronary artery disease) - As above.  Pt underwent a coronary angiogram and stent placement on 5/3/2019 with Dr. Marcelina Ruvalcaba. Cardiac work-up 5/3/2019 via cardiac cath to RCA (100% proximal lesion) and LAD (99% proximal lesion), LAD with a 60% lesion but negative for flow limitation. Patient had a normal echo on 5/3/2019. Continue Brilinta, ASA and statin    Hyperlipidemia LDL goal <70 - continue Statin          DVT Prophylaxis: Lovenox  Diet: Diet NPO Effective Now  Code Status: Prior  (Advanced care planning has been discussed with patient and/or responsible family member and is reflected in the code status.  Further orders associated with this have been entered if appropriate)    Disposition: Anticipate that patient will remain in the hospital for 1 to 2 days depending on further evaluation and clinical course    Please note that over 50 minutes was spent in evaluating the patient, review of records and results, discussion with staff/family, etc.    Sarabjit Chaudhari MD

## 2021-01-21 NOTE — PROGRESS NOTES
Medication Reconciliation    List of medications patient is currently taking is complete. Source of information: 1. Conversation with patient at bedside                                      2. EPIC records      Allergies  Penicillins     Notes regarding home medications:   1. Patient did not take any medications prior to coming to the hospital today.

## 2021-01-22 VITALS
WEIGHT: 195.99 LBS | DIASTOLIC BLOOD PRESSURE: 80 MMHG | OXYGEN SATURATION: 96 % | HEIGHT: 67 IN | BODY MASS INDEX: 30.76 KG/M2 | HEART RATE: 86 BPM | RESPIRATION RATE: 16 BRPM | SYSTOLIC BLOOD PRESSURE: 120 MMHG | TEMPERATURE: 97.5 F

## 2021-01-22 LAB
ANION GAP SERPL CALCULATED.3IONS-SCNC: 13 MMOL/L (ref 3–16)
BASOPHILS ABSOLUTE: 0.1 K/UL (ref 0–0.2)
BASOPHILS RELATIVE PERCENT: 0.9 %
BUN BLDV-MCNC: 13 MG/DL (ref 7–20)
CALCIUM SERPL-MCNC: 9.5 MG/DL (ref 8.3–10.6)
CHLORIDE BLD-SCNC: 103 MMOL/L (ref 99–110)
CO2: 25 MMOL/L (ref 21–32)
CREAT SERPL-MCNC: 0.8 MG/DL (ref 0.6–1.1)
EOSINOPHILS ABSOLUTE: 0.1 K/UL (ref 0–0.6)
EOSINOPHILS RELATIVE PERCENT: 1.7 %
GFR AFRICAN AMERICAN: >60
GFR NON-AFRICAN AMERICAN: >60
GLUCOSE BLD-MCNC: 105 MG/DL (ref 70–99)
HCT VFR BLD CALC: 38.9 % (ref 36–48)
HEMOGLOBIN: 13 G/DL (ref 12–16)
LV EF: 84 %
LVEF MODALITY: NORMAL
LYMPHOCYTES ABSOLUTE: 2.5 K/UL (ref 1–5.1)
LYMPHOCYTES RELATIVE PERCENT: 34 %
MCH RBC QN AUTO: 28.2 PG (ref 26–34)
MCHC RBC AUTO-ENTMCNC: 33.3 G/DL (ref 31–36)
MCV RBC AUTO: 84.7 FL (ref 80–100)
MONOCYTES ABSOLUTE: 0.6 K/UL (ref 0–1.3)
MONOCYTES RELATIVE PERCENT: 7.8 %
NEUTROPHILS ABSOLUTE: 4.1 K/UL (ref 1.7–7.7)
NEUTROPHILS RELATIVE PERCENT: 55.6 %
PDW BLD-RTO: 13.4 % (ref 12.4–15.4)
PLATELET # BLD: 251 K/UL (ref 135–450)
PMV BLD AUTO: 8.5 FL (ref 5–10.5)
POTASSIUM REFLEX MAGNESIUM: 3.8 MMOL/L (ref 3.5–5.1)
RBC # BLD: 4.59 M/UL (ref 4–5.2)
SODIUM BLD-SCNC: 141 MMOL/L (ref 136–145)
TROPONIN: <0.01 NG/ML
TROPONIN: <0.01 NG/ML
WBC # BLD: 7.4 K/UL (ref 4–11)

## 2021-01-22 PROCEDURE — A9502 TC99M TETROFOSMIN: HCPCS | Performed by: INTERNAL MEDICINE

## 2021-01-22 PROCEDURE — 36415 COLL VENOUS BLD VENIPUNCTURE: CPT

## 2021-01-22 PROCEDURE — 99214 OFFICE O/P EST MOD 30 MIN: CPT | Performed by: INTERNAL MEDICINE

## 2021-01-22 PROCEDURE — G0378 HOSPITAL OBSERVATION PER HR: HCPCS

## 2021-01-22 PROCEDURE — 3430000000 HC RX DIAGNOSTIC RADIOPHARMACEUTICAL: Performed by: INTERNAL MEDICINE

## 2021-01-22 PROCEDURE — 85025 COMPLETE CBC W/AUTO DIFF WBC: CPT

## 2021-01-22 PROCEDURE — 6370000000 HC RX 637 (ALT 250 FOR IP): Performed by: INTERNAL MEDICINE

## 2021-01-22 PROCEDURE — 93017 CV STRESS TEST TRACING ONLY: CPT

## 2021-01-22 PROCEDURE — 6360000002 HC RX W HCPCS: Performed by: INTERNAL MEDICINE

## 2021-01-22 PROCEDURE — 78452 HT MUSCLE IMAGE SPECT MULT: CPT

## 2021-01-22 PROCEDURE — 80048 BASIC METABOLIC PNL TOTAL CA: CPT

## 2021-01-22 PROCEDURE — 84484 ASSAY OF TROPONIN QUANT: CPT

## 2021-01-22 RX ADMIN — TETROFOSMIN 30 MILLICURIE: 1.38 INJECTION, POWDER, LYOPHILIZED, FOR SOLUTION INTRAVENOUS at 08:24

## 2021-01-22 RX ADMIN — TICAGRELOR 90 MG: 90 TABLET ORAL at 10:05

## 2021-01-22 RX ADMIN — ACETAMINOPHEN 650 MG: 325 TABLET ORAL at 06:27

## 2021-01-22 RX ADMIN — ASPIRIN 81 MG: 81 TABLET, COATED ORAL at 10:05

## 2021-01-22 RX ADMIN — REGADENOSON 0.4 MG: 0.08 INJECTION, SOLUTION INTRAVENOUS at 08:23

## 2021-01-22 RX ADMIN — TETROFOSMIN 10 MILLICURIE: 1.38 INJECTION, POWDER, LYOPHILIZED, FOR SOLUTION INTRAVENOUS at 07:11

## 2021-01-22 ASSESSMENT — PAIN DESCRIPTION - LOCATION: LOCATION: HEAD;CHEST

## 2021-01-22 ASSESSMENT — PAIN DESCRIPTION - DESCRIPTORS: DESCRIPTORS: ACHING

## 2021-01-22 NOTE — PLAN OF CARE
Problem: Pain Control  Goal: Maintain pain level at or below patient's acceptable level (or 5 if patient is unable to determine acceptable level)  Outcome: Ongoing   Pain/discomfort being managed with PRN analgesics per MD orders. Pt able to express presence and absence of pain and rate pain appropriately using numerical scale. Cardiac monitor. NTG PRN. Stress test today. Problem: Cardiovascular  Goal: No DVT, peripheral vascular complications  Outcome: Ongoing   Lovenox as ordered. Problem: Skin Integrity/Risk  Goal: No skin breakdown during hospitalization  Outcome: Ongoing   Skin care protocal in place.

## 2021-01-22 NOTE — FLOWSHEET NOTE
provided advance directive forms and explanation of living will and healthcare power of  for patient and her . Patient is tearful and feeling hopeless; tired of being sick and in hospital.   offered emotional support, ministerial presence, and prayer.

## 2021-01-22 NOTE — FLOWSHEET NOTE
Lab notified that 4pm troponin was never drawn. Instructed that 10pm needs to be drawn as well as another 6 hours later to total 3 as ordered.

## 2021-01-22 NOTE — CONSULTS
[facility]  1970 January 22, 2021    Reason for Consult: Chest Pain    CC: Chest Pain    HPI:  The patient is 44 y.o. female with a past medical history significant for CAD with prior PCI, hyperlipidemia and tobacco abuse who presented to the [facility] ED with chest pain. With her prior NSTEMI, she underwent coronary angiogram on 4/30/19 resulting in PCI with MELBA x 2 to her 100% occluded proximal RCA. She also underwent MELBA placement to proximal Circumflex lesion. I was asked to see her given her prior hisotry and new chest pain. She ollows with me as an outpatient. Romel Siddiqui states that she wasn't feeling good on Wednesday. She had some diarrhea and was \"exhausted\". She woke up yesterday at 6am feeling like she had something sitting on her chest. She was short of breath. There was radiation of the pain into her neck and into her left arm. There were no aggravating or relieving factors with this. She received a nitro in the ED and felt like it took the pressure away but not all the pain. Today, she feels \"pretty good\" with only some occasional shortness of breath. Review of Systems:  Constitutional: No fatigue, weakness, night sweats or fever. HEENT: No new vision difficulties or ringing in the ears. Respiratory: No new SOB, PND, orthopnea or cough. Cardiovascular: See HPI   GI: No n/v, diarrhea, constipation, abdominal pain or changes in bowel habits. No melena, no hematochezia  : No urinary frequency, urgency, incontinence, hematuria or dysuria. Skin: No cyanosis or skin lesions. Musculoskeletal: No new muscle or joint pain. Neurological: No syncope or TIA-like symptoms.   Psychiatric: No anxiety, insomnia or depression     Past Medical History:   Diagnosis Date    CAD (coronary artery disease)     Hyperlipidemia     MI (myocardial infarction) ([facility] [address]      Past Surgical History:   Procedure Laterality Date    CERVICAL FUSION  11/2018    HYSTERECTOMY      TONSILLECTOMY       History reviewed. No pertinent family history.   Social History     Tobacco Use    Smoking status: Former Smoker    Smokeless tobacco: Never Used   Substance Use Topics    Alcohol use: Yes     Frequency: Monthly or less    Drug use: Never       Allergies   Allergen Reactions    Penicillins      Current Facility-Administered Medications   Medication Dose Route Frequency Provider Last Rate Last Admin    nitroGLYCERIN (NITROSTAT) SL tablet 0.4 mg  0.4 mg Sublingual Q5 Min PRN Enid Jon MD   0.4 mg at 01/21/21 1010    zolpidem (AMBIEN) tablet 10 mg  10 mg Oral Nightly PRN Enid Jon MD        rosuvastatin (CRESTOR) tablet 20 mg  20 mg Oral Daily Enid Jon MD   20 mg at 01/21/21 2153    ticagrelor (BRILINTA) tablet 90 mg  90 mg Oral BID Enid Jon MD   90 mg at 01/22/21 1005    aspirin EC tablet 81 mg  81 mg Oral Daily Enid Jon MD   81 mg at 01/22/21 1005    sodium chloride flush 0.9 % injection 10 mL  10 mL Intravenous 2 times per day Enid Jon MD   10 mL at 01/21/21 2155    sodium chloride flush 0.9 % injection 10 mL  10 mL Intravenous PRN Enid Jon MD        ondansetron [facility]) injection 4 mg  4 mg Intravenous Q4H PRN Enid Jon MD        polyethylene glycol [facility]) packet 17 g  17 g Oral Daily PRN Enid Jon MD        acetaminophen (TYLENOL) tablet 650 mg  650 mg Oral Q4H PRN Enid Jon MD   650 mg at 01/22/21 5694    Or    acetaminophen (TYLENOL) suppository 650 mg  650 mg Rectal Q4H PRN Enid Jon MD        enoxaparin (LOVENOX) injection 40 mg  40 mg Subcutaneous Nightly Enid Jon MD   40 mg at 01/21/21 2151    acyclovir (ZOVIRAX) capsule 400 mg  400 mg Oral TID PRN Enid Jon MD           Physical Exam:   /80   Pulse 86   Temp 97.5 °F (36.4 °C) (Oral)   Resp 16   Ht 5' 7\" (1.702 m)   Wt 195 lb 15.8 oz (88.9 kg)   SpO2 96%   BMI 30.70 kg/m²     Intake/Output  The more  proximal stent was a 3 mm x 23-mm [place] drug-eluting stent dilated to  3.1.  Distal to this and overlapping was a 2.75 x 12-mm [place]  drug-eluting stent.  In the left main, there was no significant disease. The circumflex had a 99% proximal lesion that was intervened upon with a  2.75 x 12-mm [place] drug-eluting stent dilated to 2.75 mm.  In the left  anterior descending artery, there is a focal 60% lesion present with no  flow limitation. 2.  Preserved left ventricular systolic function.  LV ejection fraction  50% but basal to mid-inferior wall hypokinesis. 3.  Left ventricular end-diastolic pressure 11 to 13 mmHg. 4.  No gradient across the aortic valve on pullback to suggest aortic stenosis.     Imaging:      Echo 5/1/19  Normal left ventricle size, wall thickness, and systolic function with an  estimated ejection fraction of 60-65%. No regional wall motion abnormalities are seen. The right ventricle is normal in size and function. Assessment:  1. Chest Pain, atypical  2. CAD of native coronary arteries without angina  3. Essential Hypertension  4. Hyperlipidemia with goal LDL<70mg/dl    Plan:  Kindra's chest pain is atypical for angina and not like her prior angina. It was relatively unrelieved with nitro and occurred at rest. Her ECG is normal as are his troponin assays. Her stress perfusion study was normal with no ischemia or scar. I would not pursue this at this time. She can be discharged to home on her current cardiac medications. I can see her in the office at our regular follow-up appointment in early February. We will sign off for now. Please call with questions.

## 2021-02-03 NOTE — PROGRESS NOTES
[facility]                     Cardiology Progress Note    Sandy Lucas  1970 February 4, 2021      CC: \"I am still tired\"     HPI:  The patient is 44 y.o. female with a past medical history significant for CAD, hyperlipidemia and tobacco abuse. She presented to [facility] with chest pain with associated tingling and numbness in her left arm. She was found to have T wave inversion inferolaterally and elevated troponin assays consistent with NSTEMI. She was urgently flown to [facility]. She underwent coronary angiogram on 4/30/19 resulting in PCI with MELBA x 2 to her 100% occluded proximal RCA. She also underwent MELBA placement to proximal Circumflex lesion. She presented to [facility] with complaints of chest pain. She completed a stress perfusion with normal results. She presents today for follow up. Today, she reports feeling \"okay\". She continues to feel fatigued since discharge. The chest pain she was experienced has improved and will only last for a few seconds when it presents. Patient denies exertional chest pain/pressure, dyspnea at rest, LUNA, PND, orthopnea, palpitations, lightheadedness, weight changes, changes in LE edema, and syncope. She reports medication compliance and is tolerating. Review of Systems:  Constitutional: No fatigue, weakness, night sweats or fever. HEENT: No new vision difficulties or ringing in the ears. Respiratory: No new SOB, PND, orthopnea or cough. Cardiovascular: See HPI   GI: No n/v, diarrhea, constipation, abdominal pain or changes in bowel habits. No melena, no hematochezia  : No urinary frequency, urgency, incontinence, hematuria or dysuria. Skin: No cyanosis or skin lesions. Musculoskeletal: No new muscle or joint pain. Neurological: No syncope or TIA-like symptoms.   Psychiatric: No anxiety, insomnia or depression     Past Medical History:   Diagnosis Date    CAD (coronary artery disease)     Hyperlipidemia     [facility])      Past Surgical History:   Procedure Laterality Date    CERVICAL FUSION  11/2018    HYSTERECTOMY      TONSILLECTOMY       Family history:  Her father has a cardiac history including bypass. She also reports uncles on both her mother and father's side with cardiac history. Social History     Tobacco Use    Smoking status: Former Smoker    Smokeless tobacco: Never Used   Substance Use Topics    Alcohol use: Yes     Frequency: Monthly or less    Drug use: Never       Allergies   Allergen Reactions    Penicillins      Current Outpatient Medications   Medication Sig Dispense Refill    valACYclovir (VALTREX) 1 g tablet Take 1,000 mg by mouth as needed       rosuvastatin (CRESTOR) 20 MG tablet TAKE 1 TABLET DAILY 90 tablet 3    BRILINTA 90 MG TABS tablet TAKE 1 TABLET TWICE A  tablet 3    zolpidem (AMBIEN) 10 MG tablet Take 5 mg by mouth nightly as needed for Sleep.  aspirin 81 MG EC tablet TAKE 1 TABLET BY MOUTH EVERY DAY 30 tablet 5     No current facility-administered medications for this visit. Physical Exam:   /86   Pulse 96   Temp 97.8 °F (36.6 °C)   Ht 5' 7\" (1.702 m)   Wt 182 lb (82.6 kg)   SpO2 97%   BMI 28.51 kg/m²   No intake or output data in the 24 hours ending 02/04/21 1040  Wt Readings from Last 2 Encounters:   02/04/21 182 lb (82.6 kg)   01/22/21 195 lb 15.8 oz (88.9 kg)     Constitutional: She is oriented to person, place, and time. She appears well-developed and well-nourished. In no acute distress. Head: Normocephalic and atraumatic. Neck: Neck supple. No JVD present. Carotid bruit is not present. No mass and no thyromegaly present. No lymphadenopathy present. Cardiovascular: Normal rate, regular rhythm, normal heart sounds and intact distal pulses. Exam reveals no gallop and no friction rub. No murmur heard. Pulmonary/Chest: Effort normal and breath sounds normal. No respiratory distress.  She has no wheezes, rhonchi or rales. Abdominal: Soft, non-tender. Bowel sounds and aorta are normal. She exhibits no organomegaly, mass or bruit. Extremities: No edema, cyanosis, or clubbing. Pulses are 2+ radial/carotid/dorsalis pedis and posterior tibial bilaterally. Neurological: She is alert and oriented to person, place, and time. She has normal reflexes. No cranial nerve deficit. Coordination normal.   Skin: Skin is warm and dry. There is no rash or diaphoresis. Psychiatric: She has a normal mood and affect. Her speech is normal and behavior is normal.     EKG Interpretation 5/17/19: Sinus rhythm with prior inferior infarct  EKG Interpretation 1/29/20: Sinus Rhythm, Low voltage in precordial leads, nonspecific T-abnormality. Procedures:     [facility] 4/30/19  1. Right-dominant coronary arterial circulation with 100% occlusion of  the proximal RCA. This was covered with overlapping stents. The more  proximal stent was a 3 mm x 23-mm [place] drug-eluting stent dilated to  3.1. Distal to this and overlapping was a 2.75 x 12-mm [place]  drug-eluting stent. In the left main, there was no significant disease. The circumflex had a 99% proximal lesion that was intervened upon with a  2.75 x 12-mm [place] drug-eluting stent dilated to 2.75 mm. In the left  anterior descending artery, there is a focal 60% lesion present with no  flow limitation. 2.  Preserved left ventricular systolic function. LV ejection fraction  50% but basal to mid-inferior wall hypokinesis. 3.  Left ventricular end-diastolic pressure 11 to 13 mmHg. 4.  No gradient across the aortic valve on pullback to suggest aortic stenosis.     Imaging:     Echo 5/1/19  Normal left ventricle size, wall thickness, and systolic function with an  estimated ejection fraction of 60-65%. No regional wall motion abnormalities are seen. The right ventricle is normal in size and function.     Stress perfusion 1/22/21  Normal myocardial perfusion study.    Normal LV size and systolic function.        Non-diagnostic EKG response due to failure to reach target heart rate.    No ischemic ECG changes at level of heart rate achieved by Lexiscan infusion    alone (83% of MPHR)        Overall findings represent a low risk study. Lab Review:   Lab Results   Component Value Date    TRIG 158 05/01/2019    HDL 57 05/01/2019    LDLCALC 68 05/01/2019    LABVLDL 32 05/01/2019 12/8/20  Trig 141 HDL 69 LDL 65    Lab Results   Component Value Date     01/22/2021    K 3.8 01/22/2021    BUN 13 01/22/2021    CREATININE 0.8 01/22/2021       Assessment:  1. CAD of native coronary arteries without angina  2. Hyperlipidemia with LDL goal <70 mg/dL     Plan:  I think that Ms. Anabel Brink  is entirely stable from a cardiovascular standpoint. I see no need to make any changes currently in her medical regimen or pursue further testing. The chest pain she has been experiencing could likely be related to a viral process. Her most recent lipid profile was favorable. I have encouraged her to increase her aerobic activity as tolerated and adhere to a heart healthy diet. I will see her in office for follow up in 6 months. This note was scribed in the presence of Jossy Wheatley MD by [employer], RN. Physician Attestation:  The scribes documentation has been prepared under my direction and personally reviewed by me in its entirety. I, Dr. Verna Reddy personally performed the services described in this documentation as scribed by my RN in my presence, and I confirm that the note above accurately reflects all work, treatment, procedures, and medical decision making performed by me.

## 2021-02-04 ENCOUNTER — OFFICE VISIT (OUTPATIENT)
Dept: CARDIOLOGY CLINIC | Age: 51
End: 2021-02-04
Payer: COMMERCIAL

## 2021-02-04 VITALS
OXYGEN SATURATION: 97 % | WEIGHT: 182 LBS | TEMPERATURE: 97.8 F | HEIGHT: 67 IN | SYSTOLIC BLOOD PRESSURE: 126 MMHG | HEART RATE: 96 BPM | BODY MASS INDEX: 28.56 KG/M2 | DIASTOLIC BLOOD PRESSURE: 86 MMHG

## 2021-02-04 DIAGNOSIS — I25.10 CORONARY ARTERY DISEASE INVOLVING NATIVE CORONARY ARTERY OF NATIVE HEART WITHOUT ANGINA PECTORIS: Primary | ICD-10-CM

## 2021-02-04 DIAGNOSIS — E78.5 HYPERLIPIDEMIA LDL GOAL <70: ICD-10-CM

## 2021-02-04 PROCEDURE — 99213 OFFICE O/P EST LOW 20 MIN: CPT | Performed by: INTERNAL MEDICINE

## 2021-02-05 NOTE — DISCHARGE SUMMARY
Hospitalist Discharge Summary     Denis Roberts  : 1970  MRN: [id number]    Admit date: 2021  Discharge date: 2021    Admitting Physician: Gonsalo Belcher MD    Discharge Diagnoses:   Patient Active Problem List   Diagnosis    Acute inferior myocardial infarction [facility])    Cardiogenic shock ([address]    Hyperlipidemia LDL goal <70    Anemia    Headache, unspecified headache type    Chest pain    CAD (coronary artery disease)       Admission Condition: fair    Discharged Condition: stable    Discharge Exam:  VITALS:  /80   Pulse 86   Temp 97.5 °F (36.4 °C) (Oral)   Resp 16   Ht 5' 7\" (1.702 m)   Wt 195 lb 15.8 oz (88.9 kg)   SpO2 96%   BMI 30.70 kg/m²   CONSTITUTIONAL:  awake, alert, cooperative, no apparent distress, and appears stated age  EYES:  Lids and lashes normal, PERRL, EOMI, sclera clear, conjunctiva normal  ENT:  NC/AT, MMM    NECK:  Supple, symmetrical, trachea midline, no adenopathy  HEMATOLOGIC/LYMPHATICS:  no cervical, supraclavicular or axillary lymphadenopathy  LUNGS:  clear to auscultation bilaterally, No increased work of breathing, good air exchange, no crackles or wheezing  CARDIOVASCULAR:  Regular rate and rhythm, normal S1 and S2, no S3 or S4, and no significant murmurs, rubs or gallops noted. Normal apical impulse,   ABDOMEN:  Normal active bowel sounds, soft, non-tender, non-distended, no masses palpated, no organomegally  MUSCULOSKELETAL:  Full range of motion noted. NEUROLOGIC:  Awake, alert, oriented to name, place and time. Cranial nerves II-XII are grossly intact. SKIN:  normal skin color, texture, turgor for age. Hospital Course:   HISTORY OF PRESENT ILLNESS: Pt is an 44y.o. year-old female with a history of hyperlipidemia and coronary artery disease.   Presents to the emergency room for evaluation following an acute onset of chest pain which woke her from sleep at approximately 6 AM.  She reports moderately severe substernal heaviness with a sharp component that radiates into her left shoulder and down her left arm. She states that this is accompanied by shortness of breath. She underwent a coronary angiogram and stent placement on 5/3/2019 with Dr. Telma Bowen. Cardiac work-up 5/3/2019 via cardiac cath to RCA (100% proximal lesion) and LAD (99% proximal lesion), LAD with a 60% lesion but negative for flow limitation.  Patient had a normal echo on 5/3/2019. Initial EKG did not show ischemic changes and her initial troponin was not elevated. Chest pain  Plan per Cardiology:  Drews chest pain is atypical for angina and not like her prior angina. It was relatively unrelieved with nitro and occurred at rest. Her ECG is normal as are his troponin assays. Her stress perfusion study was normal with no ischemia or scar. I would not pursue this at this time. She can be discharged to home on her current cardiac medications. I can see her in the office at our regular follow-up appointment in early February.       CAD (coronary artery disease) - As above. Pt underwent a coronary angiogram and stent placement on 5/3/2019 with Dr. Telma Bowen. Cardiac work-up 5/3/2019 via cardiac cath to RCA (100% proximal lesion) and LAD (99% proximal lesion), LAD with a 60% lesion but negative for flow limitation.  Patient had a normal echo on 5/3/2019. Continue Brilinta, ASA and statin     Hyperlipidemia LDL goal <70 - continue Statin        Consults: cardiology    Imaging Studies:  Xr Chest Portable    Result Date: 1/21/2021  EXAMINATION: ONE XRAY VIEW OF THE CHEST 1/21/2021 9:35 am COMPARISON: 01/15/2019. HISTORY: ORDERING SYSTEM PROVIDED HISTORY: spb/cp TECHNOLOGIST PROVIDED HISTORY: Reason for exam:->spb/cp Reason for Exam: midline chest pain since this morning FINDINGS: The lungs are without acute focal process. There is no effusion or pneumothorax. The cardiomediastinal silhouette is without acute process. The osseous structures are without acute process.      No acute process. Nm Cardiac Stress Test Nuclear Imaging    Result Date: 2021  Cardiac Perfusion Imaging  Demographics   Patient Name       Kei Lopez   Date of Study      2021         Gender              Female   Patient Number     [id number]         Date of Birth       1970   Visit Number       [id number]          Age                 44 year(s)   Accession Number   [id number]         Room Number         4760   Corporate ID       [id number]           NM Technician       Rose Marie Lazaro,                                                            ELEAZAR   Nurse              Shahrzad Ball RN  Interpreting        Amna Espino.                                        Physician           Gosia Blanton MD   Ordering Physician Andria Booth MD        Physician           Merlyn Cabot, MD   The procedure was explained in detail to the patient. Risks,  complications and alternative treatments were reviewed. Written consent  was obtained. Procedure Procedure Type:   Nuclear Stress Test:NM MYOCARDIAL SPECT REST EXERCISE OR RX   Study location: [facility] - Nuclear Medicine   Indications: Chest pain. Hospital Status: Outpatient. Height: 67 inches Weight: 195 pounds  Risk Factors   The patient risk factors include:prior PCI;obesity, former tobacco use,  treated hypercholesterolemia, dyslipidemia, prior MI and ( years not  smokin). Conclusions   Summary  Normal myocardial perfusion study. Normal LV size and systolic function. Non-diagnostic EKG response due to failure to reach target heart rate. No ischemic ECG changes at level of heart rate achieved by Lexiscan infusion  alone (83% of MPHR)   Overall findings represent a low risk study. Stress Protocols   Resting ECG  Normal sinus rhythm.    Resting HR:82 bpm  Resting BP:128/86 mmHg   Pre-stress physical exam: Complaints of  mild nausea, headache, and chest pressure  5/10. Heart and lung sounds unremarkable. O2 saturation 99% on room air. Troponin T  negative x 3. Adult plus BP cuff used. Stress Protocol:Pharmacologic - Lexiscan's  Peak HR:139 bpm                            HR/BP product:24889  Peak BP:127/75 mmHg  Predicted HR: 168 bpm  % of predicted HR: 83  Test duration: 1 min and 40 sec  Reason for termination:Completed   Arrhythmias  None   Symptoms  Lexiscan 0.4 mg IV given followed by brief shortness of breath,  nausea, headache. Chest pressure 4/10. O2 saturation 100% on room air. Complications  Procedure complication was none. Stress Interpretation  Non-diagnostic EKG response due to failure to reach target heart rate. No ischemic ECG changes at level of heart rate achieved by Lexiscan  infusion (83% of MPHR)  Procedure Medications   - Lexiscan I.V. 0.4 mg.10 sec  Imaging Protocols   - One Day   Rest                       Stress   Isotope:Myoview            Isotope: Myoview  Isotope dose:12.3 mCi      Isotope dose:32.4 mCi  Administration Route:I.V. Administration Route:I.V.  Date:01/22/2021 00:00      Date:01/22/2021 00:00                              Technique:     Gated  Imaging Results     Study artifacts     1) Breast    attenuation     Summed scores     - Summed stress score: 2     - Summed rest score: 6     - Summed difference score:    1     Stress ejection    Ejection fraction:84 %    EDV :55 ml    ESV :9 ml    Stroke volume :46 ml  Medical History   Additional Medical History   Inferior wall MI, coronary stents [5/2019].    Signatures   ------------------------------------------------------------------  Electronically signed by Mario Wiley MD  (Interpreting physician) on 01/22/2021 at 11:37  ------------------------------------------------------------------        Other Significant Diagnostic Studies: As described above    Treatments: As described above    Disposition: home    Discharge Medications:     Bill, [address] [facility] Medication Instructions FRQ:[id number]    Printed on:02/04/21 1956   Medication Information                      aspirin 81 MG EC tablet  TAKE 1 TABLET BY MOUTH EVERY DAY             BRILINTA 90 MG TABS tablet  TAKE 1 TABLET TWICE A DAY             rosuvastatin (CRESTOR) 20 MG tablet  TAKE 1 TABLET DAILY             valACYclovir (VALTREX) 1 g tablet  Take 1,000 mg by mouth as needed              zolpidem (AMBIEN) 10 MG tablet  Take 5 mg by mouth nightly as needed for Sleep. 35 Minutes spent on patient evaluation, counseling and discharge planning.      Signed:  Ghada Garcia MD  2/4/2021, 7:56 PM

## 2021-08-17 NOTE — PROGRESS NOTES
[address]                     Cardiology Progress Note    Royal Bermudez  1970 August 19, 2021      CC: \"I feel good\"     HPI:  The patient is 44 y.o. female with a past medical history significant for CAD, hyperlipidemia and tobacco abuse. She presented to [facility] with chest pain with associated tingling and numbness in her left arm. She was found to have T wave inversion inferolaterally and elevated troponin assays consistent with NSTEMI. She was urgently flown to [facility]. She underwent coronary angiogram on 4/30/19 resulting in PCI with MELBA x 2 to her 100% occluded proximal RCA. She also underwent MELBA placement to proximal Circumflex lesion. She presented to [facility] with complaints of chest pain. She completed a stress perfusion with normal results. She presents today for follow up. She denies chest pain with rest or exertion. Her breathing has been comfortable without shortness of breath. She admits to occasional fatigue. She tries to maintain an active lifestyle and denies any exertional symptoms. She reports medication compliance and is tolerating. She admits to chronic lower extremity edema. 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 Review of Systems:  Constitutional: No fatigue, weakness, night sweats or fever. HEENT: No new vision difficulties or ringing in the ears. Respiratory: No new SOB, PND, orthopnea or cough. Cardiovascular: See HPI   GI: No n/v, diarrhea, constipation, abdominal pain or changes in bowel habits. No melena, no hematochezia  : No urinary frequency, urgency, incontinence, hematuria or dysuria. Skin: No cyanosis or skin lesions. Musculoskeletal: No new muscle or joint pain. Neurological: No syncope or TIA-like symptoms.   Psychiatric: No anxiety, insomnia or depression     Past Medical History:   Diagnosis Date    CAD (coronary artery disease)     Hyperlipidemia     MI (myocardial infarction) [facility])      Past Surgical History:   Procedure Laterality Date    CERVICAL FUSION  11/2018    HYSTERECTOMY      TONSILLECTOMY       Family history:  Her father has a cardiac history including bypass. She also reports uncles on both her mother and father's side with cardiac history. Social History     Tobacco Use    Smoking status: Former Smoker    Smokeless tobacco: Never Used   Vaping Use    Vaping Use: Never assessed   Substance Use Topics    Alcohol use: Yes    Drug use: Never       Allergies   Allergen Reactions    Penicillins      Current Outpatient Medications   Medication Sig Dispense Refill    VORTIoxetine (TRINTELLIX) 5 MG tablet       ticagrelor (BRILINTA) 90 MG TABS tablet TAKE 1 TABLET TWICE A  tablet 3    rosuvastatin (CRESTOR) 20 MG tablet TAKE 1 TABLET DAILY 90 tablet 3    zolpidem (AMBIEN) 10 MG tablet Take 5 mg by mouth nightly as needed for Sleep.  aspirin 81 MG EC tablet TAKE 1 TABLET BY MOUTH EVERY DAY 30 tablet 5    valACYclovir (VALTREX) 1 g tablet Take 1,000 mg by mouth as needed        No current facility-administered medications for this visit. Physical Exam:   /70   Pulse 75   Ht 5' 7\" (1.702 m)   Wt 174 lb 3.2 oz (79 kg)   SpO2 98%   BMI 27.28 kg/m²   No intake or output data in the 24 hours ending 08/19/21 1228  Wt Readings from Last 2 Encounters:   08/19/21 174 lb 3.2 oz (79 kg)   02/04/21 182 lb (82.6 kg)     Constitutional: She is oriented to person, place, and time. She appears well-developed and well-nourished. In no acute distress. Head: Normocephalic and atraumatic. Neck: Neck supple. No JVD present. Carotid bruit is not present. No mass and no thyromegaly present. No lymphadenopathy present. Cardiovascular: Normal rate, regular rhythm, normal heart sounds and intact distal pulses. Exam reveals no gallop and no friction rub. No murmur heard.   Pulmonary/Chest: Effort normal and breath sounds normal. No respiratory distress. She has no wheezes, rhonchi or rales. Abdominal: Soft, non-tender. Bowel sounds and aorta are normal. She exhibits no organomegaly, mass or bruit. Extremities: Bilateral Lower extremity lymphedema, No cyanosis, or clubbing. Pulses are 2+ radial/carotid/dorsalis pedis and posterior tibial bilaterally. Bilateral lower extremity lymphedema   Neurological: She is alert and oriented to person, place, and time. She has normal reflexes. No cranial nerve deficit. Coordination normal.   Skin: Skin is warm and dry. There is no rash or diaphoresis. Psychiatric: She has a normal mood and affect. Her speech is normal and behavior is normal.     Personally reviewed and interpreted   EKG Interpretation 5/17/19: Sinus rhythm with prior inferior infarct  EKG Interpretation 1/29/20: Sinus Rhythm, Low voltage in precordial leads, nonspecific T-abnormality. Procedures:     [facility] 4/30/19  1. Right-dominant coronary arterial circulation with 100% occlusion of  the proximal RCA. This was covered with overlapping stents. The more  proximal stent was a 3 mm x 23-mm [place] drug-eluting stent dilated to  3.1. Distal to this and overlapping was a 2.75 x 12-mm [place]  drug-eluting stent. In the left main, there was no significant disease. The circumflex had a 99% proximal lesion that was intervened upon with a  2.75 x 12-mm [place] drug-eluting stent dilated to 2.75 mm. In the left  anterior descending artery, there is a focal 60% lesion present with no  flow limitation. 2.  Preserved left ventricular systolic function. LV ejection fraction  50% but basal to mid-inferior wall hypokinesis. 3.  Left ventricular end-diastolic pressure 11 to 13 mmHg. 4.  No gradient across the aortic valve on pullback to suggest aortic stenosis.       Imaging:     Echo 5/1/19  Normal left ventricle size, wall thickness, and systolic function with an  estimated ejection fraction of 60-65%.   No regional wall motion

## 2021-08-19 ENCOUNTER — OFFICE VISIT (OUTPATIENT)
Dept: CARDIOLOGY CLINIC | Age: 51
End: 2021-08-19
Payer: COMMERCIAL

## 2021-08-19 VITALS
OXYGEN SATURATION: 98 % | HEIGHT: 67 IN | SYSTOLIC BLOOD PRESSURE: 114 MMHG | WEIGHT: 174.2 LBS | DIASTOLIC BLOOD PRESSURE: 70 MMHG | BODY MASS INDEX: 27.34 KG/M2 | HEART RATE: 75 BPM

## 2021-08-19 DIAGNOSIS — E78.5 HYPERLIPIDEMIA LDL GOAL <70: ICD-10-CM

## 2021-08-19 DIAGNOSIS — I89.0 LYMPHEDEMA OF BOTH LOWER EXTREMITIES: ICD-10-CM

## 2021-08-19 DIAGNOSIS — I25.10 CORONARY ARTERY DISEASE INVOLVING NATIVE CORONARY ARTERY OF NATIVE HEART WITHOUT ANGINA PECTORIS: Primary | ICD-10-CM

## 2021-08-19 PROCEDURE — 99214 OFFICE O/P EST MOD 30 MIN: CPT | Performed by: INTERNAL MEDICINE

## 2021-11-28 DIAGNOSIS — E78.5 HYPERLIPIDEMIA LDL GOAL <70: ICD-10-CM

## 2021-11-29 RX ORDER — ROSUVASTATIN CALCIUM 20 MG/1
TABLET, COATED ORAL
Qty: 90 TABLET | Refills: 0 | Status: SHIPPED | OUTPATIENT
Start: 2021-11-29 | End: 2022-02-24 | Stop reason: SDUPTHER

## 2021-11-29 NOTE — TELEPHONE ENCOUNTER
Last OV: 8/19/21  Next OV: 6 mth f/u 2/2022  Last refill:9/3/20  Most recent Labs: 1/22/21  Last EKG (if needed):1/21/21

## 2022-02-16 NOTE — PROGRESS NOTES
[facility]                     Cardiology Progress Note    Tato Esteban  1970 February 17, 2022      CC: \"I feel well\"     HPI:  The patient is 46 y.o. female with a past medical history significant for CAD, hyperlipidemia and tobacco abuse. She presented to [facility] with chest pain with associated tingling and numbness in her left arm. She was found to have T wave inversion inferolaterally and elevated troponin assays consistent with NSTEMI. She was urgently flown to [facility]. She underwent coronary angiogram on 4/30/19 resulting in PCI with MELBA x 2 to her 100% occluded proximal RCA. She also underwent MELBA placement to proximal Circumflex lesion. She presented to [facility] with complaints of chest pain. She completed a stress perfusion with normal results. She presents today for follow up. She reports feeling well overall. She continues to experience lower extremity lymphedema and continues to wear compression stockings. She has improved her dietary choices recently and has lost weight as a result recently. She tries to remain active with her daily life and increase her activity level. She denies any exertional symptoms including chest pain or shortness of breath. Review of Systems:  Constitutional: No fatigue, weakness, night sweats or fever. HEENT: No new vision difficulties or ringing in the ears. Respiratory: No new SOB, PND, orthopnea or cough. Cardiovascular: See HPI   GI: No n/v, diarrhea, constipation, abdominal pain or changes in bowel habits. No melena, no hematochezia  : No urinary frequency, urgency, incontinence, hematuria or dysuria. Skin: No cyanosis or skin lesions. Musculoskeletal: No new muscle or joint pain. Neurological: No syncope or TIA-like symptoms.   Psychiatric: No anxiety, insomnia or depression     Past Medical History:   Diagnosis Date    CAD (coronary artery disease)     Hyperlipidemia     MI [facility]myocardial infarction[facility])      Past Surgical History:   Procedure Laterality Date    CERVICAL FUSION  11/2018    HYSTERECTOMY      TONSILLECTOMY       Family history:  Her father has a cardiac history including bypass. She also reports uncles on both her mother and father's side with cardiac history. Social History     Tobacco Use    Smoking status: Former Smoker    Smokeless tobacco: Never Used   Vaping Use    Vaping Use: Not on file   Substance Use Topics    Alcohol use: Yes    Drug use: Never       Allergies   Allergen Reactions    Penicillins      Current Outpatient Medications   Medication Sig Dispense Refill    rosuvastatin (CRESTOR) 20 MG tablet TAKE 1 TABLET DAILY 90 tablet 0    Elastic Bandages & Supports (MEDICAL COMPRESSION STOCKINGS) MISC 1 each by Does not apply route daily 1 each 0    zolpidem (AMBIEN) 10 MG tablet Take 5 mg by mouth nightly as needed for Sleep.  aspirin 81 MG EC tablet TAKE 1 TABLET BY MOUTH EVERY DAY 30 tablet 5    VORTIoxetine (TRINTELLIX) 5 MG tablet  (Patient not taking: Reported on 2/17/2022)       No current facility-administered medications for this visit. Physical Exam:   /78   Pulse 76   Ht 5' 7\" (1.702 m)   Wt 165 lb (74.8 kg)   SpO2 98%   BMI 25.84 kg/m²   No intake or output data in the 24 hours ending 02/17/22 1107  Wt Readings from Last 2 Encounters:   02/17/22 165 lb (74.8 kg)   08/19/21 174 lb 3.2 oz (79 kg)     Constitutional: She is oriented to person, place, and time. She appears well-developed and well-nourished. In no acute distress. Head: Normocephalic and atraumatic. Neck: Neck supple. No JVD present. Carotid bruit is not present. No mass and no thyromegaly present. No lymphadenopathy present. Cardiovascular: Normal rate, regular rhythm, normal heart sounds and intact distal pulses. Exam reveals no gallop and no friction rub. No murmur heard. Pulmonary/Chest: Effort normal and breath sounds normal. No respiratory distress.  She has no wheezes, rhonchi or rales. Abdominal: Soft, non-tender. Bowel sounds and aorta are normal. She exhibits no organomegaly, mass or bruit. Extremities: Bilateral Lower extremity lymphedema, No cyanosis, or clubbing. Pulses are 2+ radial/carotid/dorsalis pedis and posterior tibial bilaterally. Bilateral lower extremity lymphedema   Neurological: She is alert and oriented to person, place, and time. She has normal reflexes. No cranial nerve deficit. Coordination normal.   Skin: Skin is warm and dry. There is no rash or diaphoresis. Psychiatric: She has a normal mood and affect. Her speech is normal and behavior is normal.     Personally reviewed and interpreted   EKG Interpretation 5/17/19: Sinus rhythm with prior inferior infarct  EKG Interpretation 1/29/20: Sinus Rhythm, Low voltage in precordial leads, nonspecific T-abnormality. EKG Interpretation 2/17/22: Sinus rhythm, Low voltage in precordial leads, nonspecific T-abnormality. Procedures:     [facility] 4/30/19  1. Right-dominant coronary arterial circulation with 100% occlusion of  the proximal RCA. This was covered with overlapping stents. The more  proximal stent was a 3 mm x 23-mm [place] drug-eluting stent dilated to  3.1. Distal to this and overlapping was a 2.75 x 12-mm [place]  drug-eluting stent. In the left main, there was no significant disease. The circumflex had a 99% proximal lesion that was intervened upon with a  2.75 x 12-mm [place] drug-eluting stent dilated to 2.75 mm. In the left  anterior descending artery, there is a focal 60% lesion present with no  flow limitation. 2.  Preserved left ventricular systolic function. LV ejection fraction  50% but basal to mid-inferior wall hypokinesis. 3.  Left ventricular end-diastolic pressure 11 to 13 mmHg.   4.  No gradient across the aortic valve on pullback to suggest aortic stenosis.       Imaging:     Echo 5/1/19  Normal left ventricle size, wall thickness, and systolic function with an  estimated ejection fraction of 60-65%. No regional wall motion abnormalities are seen. The right ventricle is normal in size and function. Stress perfusion 1/22/21  Normal myocardial perfusion study.    Normal LV size and systolic function.        Non-diagnostic EKG response due to failure to reach target heart rate.    No ischemic ECG changes at level of heart rate achieved by Lexiscan infusion    alone (83% of MPHR)        Overall findings represent a low risk study. Lab Review:   Lab Results   Component Value Date    TRIG 158 05/01/2019    HDL 57 05/01/2019    LDLCALC 68 05/01/2019    LABVLDL 32 05/01/2019   12/10/21  Trig 113 HDL 62 LDL 82    Lab Results   Component Value Date     01/22/2021    K 3.8 01/22/2021    BUN 13 01/22/2021    CREATININE 0.8 01/22/2021       Assessment:  1. CAD of native coronary arteries without angina  2. Hyperlipidemia with LDL goal <70 mg/dL   3. Lower extremity lymphedema     Plan:   I think that Ms. [facility]  is stable from a cardiovascular standpoint. I see no need to make any changes currently in her medical regimen or pursue further testing. She is not endorsing any symptoms representing angina and her blood pressure is well controlled. Her most recent lipid profile was favorable with her current statin therapy. I will place a referral for her to be evaluated for Flexitouch given her chronic lower extremity lymphedema. She has tried compression stockings to treat with no resolution in her symptoms. I will see her in office for follow up in 1 year. This note was scribed in the presence of Edilma Bar MD by [employer], RN. Physician Attestation:  The scribes documentation has been prepared under my direction and personally reviewed by me in its entirety.      I, Dr. Yun Gross personally performed the services described in this documentation as scribed by my RN in my presence, and I confirm that the note above accurately reflects all work, treatment, procedures, and medical decision making performed by me.

## 2022-02-17 ENCOUNTER — OFFICE VISIT (OUTPATIENT)
Dept: CARDIOLOGY CLINIC | Age: 52
End: 2022-02-17
Payer: COMMERCIAL

## 2022-02-17 VITALS
OXYGEN SATURATION: 98 % | BODY MASS INDEX: 25.9 KG/M2 | WEIGHT: 165 LBS | HEIGHT: 67 IN | DIASTOLIC BLOOD PRESSURE: 78 MMHG | HEART RATE: 76 BPM | SYSTOLIC BLOOD PRESSURE: 124 MMHG

## 2022-02-17 DIAGNOSIS — E78.5 HYPERLIPIDEMIA LDL GOAL <70: ICD-10-CM

## 2022-02-17 DIAGNOSIS — I89.0 LYMPHEDEMA: ICD-10-CM

## 2022-02-17 DIAGNOSIS — I25.10 CORONARY ARTERY DISEASE INVOLVING NATIVE CORONARY ARTERY OF NATIVE HEART WITHOUT ANGINA PECTORIS: Primary | ICD-10-CM

## 2022-02-17 PROCEDURE — 93000 ELECTROCARDIOGRAM COMPLETE: CPT | Performed by: INTERNAL MEDICINE

## 2022-02-17 PROCEDURE — 99214 OFFICE O/P EST MOD 30 MIN: CPT | Performed by: INTERNAL MEDICINE

## 2022-02-24 DIAGNOSIS — E78.5 HYPERLIPIDEMIA LDL GOAL <70: ICD-10-CM

## 2022-02-25 RX ORDER — ROSUVASTATIN CALCIUM 20 MG/1
20 TABLET, COATED ORAL DAILY
Qty: 90 TABLET | Refills: 3 | Status: SHIPPED | OUTPATIENT
Start: 2022-02-25

## 2022-02-25 NOTE — TELEPHONE ENCOUNTER
Last OV: 02/17/2022  Next OV: x  Most recent Labs: 12/10/2021 chem and lipid (under care everywhere)

## 2023-02-20 ENCOUNTER — TELEPHONE (OUTPATIENT)
Dept: CARDIOLOGY CLINIC | Age: 53
End: 2023-02-20

## 2023-02-20 NOTE — TELEPHONE ENCOUNTER
Esthela Mendez called in requesting the stent card/report. She states that she had stents placed in 2016 and needs the information for an upcoming MRI scheduled for Friday, 2/24. Esthela Mendez provided a fax number to send the information to: [phone].     Kindra's callback: [phone] if any questions

## 2023-02-20 NOTE — TELEPHONE ENCOUNTER
Ovidio Lindsey called in about the fax, she gave me a new number to fax it [phone]. I faxed it to her, received a success confirmation. I left the actual card in the envelope Danial Plasencia placed at  so patient can   from the office if she wants the actual card.

## 2023-02-20 NOTE — TELEPHONE ENCOUNTER
Attempted to fax stent card multiple time with no answer. Left message for Judson Weber that I placed a copy at the front for  and will need correct fax number when she returns call.

## 2023-03-22 NOTE — PROGRESS NOTES
visit today including imaging, lab results and EKG as detailed above    I will see her in office for follow up in 1 year. This note was scribed in the presence of Dr Medardo Martinez MD by Miriam Cruz RN. Physician Attestation:  The scribes documentation has been prepared under my direction and personally reviewed by me in its entirety. I, Dr. Medardo Martinez personally performed the services described in this documentation as scribed by my RN in my presence, and I confirm that the note above accurately reflects all work, treatment, procedures, and medical decision making performed by me.

## 2023-03-23 ENCOUNTER — OFFICE VISIT (OUTPATIENT)
Dept: CARDIOLOGY CLINIC | Age: 53
End: 2023-03-23
Payer: COMMERCIAL

## 2023-03-23 VITALS
HEIGHT: 67 IN | HEART RATE: 93 BPM | DIASTOLIC BLOOD PRESSURE: 80 MMHG | OXYGEN SATURATION: 97 % | SYSTOLIC BLOOD PRESSURE: 130 MMHG | WEIGHT: 174 LBS | BODY MASS INDEX: 27.31 KG/M2

## 2023-03-23 DIAGNOSIS — I89.0 LYMPHEDEMA: ICD-10-CM

## 2023-03-23 DIAGNOSIS — E78.5 HYPERLIPIDEMIA LDL GOAL <70: ICD-10-CM

## 2023-03-23 DIAGNOSIS — I25.10 CORONARY ARTERY DISEASE INVOLVING NATIVE CORONARY ARTERY OF NATIVE HEART WITHOUT ANGINA PECTORIS: Primary | ICD-10-CM

## 2023-03-23 PROCEDURE — 99214 OFFICE O/P EST MOD 30 MIN: CPT | Performed by: INTERNAL MEDICINE

## 2023-03-23 PROCEDURE — 93000 ELECTROCARDIOGRAM COMPLETE: CPT | Performed by: INTERNAL MEDICINE

## 2023-03-23 RX ORDER — ROSUVASTATIN CALCIUM 40 MG/1
40 TABLET, COATED ORAL DAILY
Qty: 90 TABLET | Refills: 3 | Status: SHIPPED | OUTPATIENT
Start: 2023-03-23

## 2023-09-28 ENCOUNTER — TELEPHONE (OUTPATIENT)
Dept: CARDIOLOGY CLINIC | Age: 53
End: 2023-09-28

## 2023-09-28 DIAGNOSIS — E78.5 HYPERLIPIDEMIA LDL GOAL <70: Primary | ICD-10-CM

## 2023-09-28 NOTE — TELEPHONE ENCOUNTER
Dr. Jovan Kelly, please advise. Crestor was increased to 40 mg daily in OV 3/23/23 (LDL was 95). Repeat LDL 7/19/23 in 80. History of CAD.

## 2023-09-28 NOTE — TELEPHONE ENCOUNTER
Pt called to let Dr Domenic Bowden know she had had her labs drawn at [facility] back in July like he had asked her.     Kindra # [phone]

## 2023-09-29 RX ORDER — EZETIMIBE 10 MG/1
10 TABLET ORAL DAILY
Qty: 90 TABLET | Refills: 3 | Status: SHIPPED | OUTPATIENT
Start: 2023-09-29

## 2023-09-29 NOTE — TELEPHONE ENCOUNTER
Spoke with patient - she is agreeable to start Zetia 10 mg daily. Sent to pharmacy. Placed order for repeat fasting lipid profile in 3 months. She v/u.

## 2023-11-05 ENCOUNTER — APPOINTMENT (OUTPATIENT)
Dept: GENERAL RADIOLOGY | Age: 53
End: 2023-11-05
Payer: COMMERCIAL

## 2023-11-05 ENCOUNTER — APPOINTMENT (OUTPATIENT)
Dept: CT IMAGING | Age: 53
End: 2023-11-05
Attending: EMERGENCY MEDICINE
Payer: COMMERCIAL

## 2023-11-05 ENCOUNTER — HOSPITAL ENCOUNTER (OUTPATIENT)
Age: 53
Setting detail: OBSERVATION
Discharge: HOME | End: 2023-11-06
Attending: EMERGENCY MEDICINE | Admitting: INTERNAL MEDICINE
Payer: COMMERCIAL

## 2023-11-05 DIAGNOSIS — R51.9 NONINTRACTABLE HEADACHE, UNSPECIFIED CHRONICITY PATTERN, UNSPECIFIED HEADACHE TYPE: ICD-10-CM

## 2023-11-05 DIAGNOSIS — E86.0 DEHYDRATION: ICD-10-CM

## 2023-11-05 DIAGNOSIS — R07.9 CHEST PAIN, UNSPECIFIED TYPE: Primary | ICD-10-CM

## 2023-11-05 DIAGNOSIS — N17.9 AKI (ACUTE KIDNEY INJURY) (HCC): ICD-10-CM

## 2023-11-05 LAB
ALBUMIN SERPL-MCNC: 4.6 G/DL (ref 3.4–5)
ALBUMIN/GLOB SERPL: 1.8 {RATIO} (ref 1.1–2.2)
ALP SERPL-CCNC: 95 U/L (ref 40–129)
ALT SERPL-CCNC: 89 U/L (ref 10–40)
ANION GAP SERPL CALCULATED.3IONS-SCNC: 13 MMOL/L (ref 3–16)
AST SERPL-CCNC: 80 U/L (ref 15–37)
BACTERIA URNS QL MICRO: ABNORMAL /HPF
BASOPHILS # BLD: 0.1 K/UL (ref 0–0.2)
BASOPHILS NFR BLD: 0.9 %
BILIRUB SERPL-MCNC: 0.6 MG/DL (ref 0–1)
BILIRUB UR QL STRIP.AUTO: NEGATIVE
BUN SERPL-MCNC: 13 MG/DL (ref 7–20)
CALCIUM SERPL-MCNC: 9.9 MG/DL (ref 8.3–10.6)
CHLORIDE SERPL-SCNC: 102 MMOL/L (ref 99–110)
CLARITY UR: ABNORMAL
CO2 SERPL-SCNC: 24 MMOL/L (ref 21–32)
COLOR UR: YELLOW
CREAT SERPL-MCNC: 1.6 MG/DL (ref 0.6–1.1)
D DIMER: <0.27 UG/ML FEU (ref 0–0.6)
DEPRECATED RDW RBC AUTO: 13 % (ref 12.4–15.4)
EKG ATRIAL RATE: 93 BPM
EKG DIAGNOSIS: NORMAL
EKG P AXIS: 64 DEGREES
EKG P-R INTERVAL: 168 MS
EKG Q-T INTERVAL: 346 MS
EKG QRS DURATION: 86 MS
EKG QTC CALCULATION (BAZETT): 430 MS
EKG R AXIS: -16 DEGREES
EKG T AXIS: 74 DEGREES
EKG VENTRICULAR RATE: 93 BPM
EOSINOPHIL # BLD: 0.1 K/UL (ref 0–0.6)
EOSINOPHIL NFR BLD: 0.9 %
EPI CELLS #/AREA URNS AUTO: 12 /HPF (ref 0–5)
FINE GRAN CASTS #/AREA URNS HPF: ABNORMAL /LPF (ref 0–2)
FLUAV RNA UPPER RESP QL NAA+PROBE: NEGATIVE
FLUBV AG NPH QL: NEGATIVE
GFR SERPLBLD CREATININE-BSD FMLA CKD-EPI: 38 ML/MIN/{1.73_M2}
GLUCOSE SERPL-MCNC: 122 MG/DL (ref 70–99)
GLUCOSE UR STRIP.AUTO-MCNC: NEGATIVE MG/DL
HCG SERPL QL: NEGATIVE
HCT VFR BLD AUTO: 37.4 % (ref 36–48)
HGB BLD-MCNC: 13.3 G/DL (ref 12–16)
HGB UR QL STRIP.AUTO: ABNORMAL
HYALINE CASTS #/AREA URNS AUTO: 79 /LPF (ref 0–8)
KETONES UR STRIP.AUTO-MCNC: NEGATIVE MG/DL
LEUKOCYTE ESTERASE UR QL STRIP.AUTO: NEGATIVE
LIPASE SERPL-CCNC: 37 U/L (ref 13–60)
LYMPHOCYTES # BLD: 1.7 K/UL (ref 1–5.1)
LYMPHOCYTES NFR BLD: 18.7 %
MAGNESIUM SERPL-MCNC: 2.3 MG/DL (ref 1.8–2.4)
MCH RBC QN AUTO: 29 PG (ref 26–34)
MCHC RBC AUTO-ENTMCNC: 35.6 G/DL (ref 31–36)
MCV RBC AUTO: 81.3 FL (ref 80–100)
MONOCYTES # BLD: 0.7 K/UL (ref 0–1.3)
MONOCYTES NFR BLD: 7.3 %
NEUTROPHILS # BLD: 6.6 K/UL (ref 1.7–7.7)
NEUTROPHILS NFR BLD: 72.2 %
NITRITE UR QL STRIP.AUTO: NEGATIVE
PH UR STRIP.AUTO: 6 [PH] (ref 5–8)
PLATELET # BLD AUTO: 269 K/UL (ref 135–450)
PMV BLD AUTO: 9.1 FL (ref 5–10.5)
POTASSIUM SERPL-SCNC: 3.4 MMOL/L (ref 3.5–5.1)
PROT SERPL-MCNC: 7.1 G/DL (ref 6.4–8.2)
PROT UR STRIP.AUTO-MCNC: 300 MG/DL
RBC # BLD AUTO: 4.61 M/UL (ref 4–5.2)
RBC #/AREA URNS HPF: ABNORMAL /HPF (ref 0–4)
SARS-COV-2 RDRP RESP QL NAA+PROBE: NOT DETECTED
SODIUM SERPL-SCNC: 139 MMOL/L (ref 136–145)
SP GR UR STRIP.AUTO: 1.02 (ref 1–1.03)
TROPONIN, HIGH SENSITIVITY: 10 NG/L (ref 0–14)
TROPONIN, HIGH SENSITIVITY: 10 NG/L (ref 0–14)
UA COMPLETE W REFLEX CULTURE PNL UR: ABNORMAL
UA DIPSTICK W REFLEX MICRO PNL UR: YES
URN SPEC COLLECT METH UR: ABNORMAL
UROBILINOGEN UR STRIP-ACNC: 1 E.U./DL
WBC # BLD AUTO: 9.2 K/UL (ref 4–11)
WBC #/AREA URNS AUTO: 9 /HPF (ref 0–5)

## 2023-11-05 PROCEDURE — 96365 THER/PROPH/DIAG IV INF INIT: CPT

## 2023-11-05 PROCEDURE — 83690 ASSAY OF LIPASE: CPT

## 2023-11-05 PROCEDURE — 6360000004 HC RX CONTRAST MEDICATION: Performed by: EMERGENCY MEDICINE

## 2023-11-05 PROCEDURE — 96361 HYDRATE IV INFUSION ADD-ON: CPT

## 2023-11-05 PROCEDURE — 85379 FIBRIN DEGRADATION QUANT: CPT

## 2023-11-05 PROCEDURE — 6360000002 HC RX W HCPCS: Performed by: EMERGENCY MEDICINE

## 2023-11-05 PROCEDURE — 93005 ELECTROCARDIOGRAM TRACING: CPT | Performed by: EMERGENCY MEDICINE

## 2023-11-05 PROCEDURE — 36415 COLL VENOUS BLD VENIPUNCTURE: CPT

## 2023-11-05 PROCEDURE — 87635 SARS-COV-2 COVID-19 AMP PRB: CPT

## 2023-11-05 PROCEDURE — 2580000003 HC RX 258: Performed by: INTERNAL MEDICINE

## 2023-11-05 PROCEDURE — 6360000002 HC RX W HCPCS: Performed by: INTERNAL MEDICINE

## 2023-11-05 PROCEDURE — 99285 EMERGENCY DEPT VISIT HI MDM: CPT

## 2023-11-05 PROCEDURE — 71045 X-RAY EXAM CHEST 1 VIEW: CPT

## 2023-11-05 PROCEDURE — G0378 HOSPITAL OBSERVATION PER HR: HCPCS

## 2023-11-05 PROCEDURE — 83735 ASSAY OF MAGNESIUM: CPT

## 2023-11-05 PROCEDURE — 74177 CT ABD & PELVIS W/CONTRAST: CPT

## 2023-11-05 PROCEDURE — 6370000000 HC RX 637 (ALT 250 FOR IP): Performed by: INTERNAL MEDICINE

## 2023-11-05 PROCEDURE — 84703 CHORIONIC GONADOTROPIN ASSAY: CPT

## 2023-11-05 PROCEDURE — 85025 COMPLETE CBC W/AUTO DIFF WBC: CPT

## 2023-11-05 PROCEDURE — 81001 URINALYSIS AUTO W/SCOPE: CPT

## 2023-11-05 PROCEDURE — 2580000003 HC RX 258: Performed by: EMERGENCY MEDICINE

## 2023-11-05 PROCEDURE — 87804 INFLUENZA ASSAY W/OPTIC: CPT

## 2023-11-05 PROCEDURE — 84484 ASSAY OF TROPONIN QUANT: CPT

## 2023-11-05 PROCEDURE — 93010 ELECTROCARDIOGRAM REPORT: CPT | Performed by: INTERNAL MEDICINE

## 2023-11-05 PROCEDURE — 96375 TX/PRO/DX INJ NEW DRUG ADDON: CPT

## 2023-11-05 PROCEDURE — 80053 COMPREHEN METABOLIC PANEL: CPT

## 2023-11-05 PROCEDURE — 6370000000 HC RX 637 (ALT 250 FOR IP): Performed by: EMERGENCY MEDICINE

## 2023-11-05 RX ORDER — SODIUM CHLORIDE 0.9 % (FLUSH) 0.9 %
5-40 SYRINGE (ML) INJECTION PRN
Status: DISCONTINUED | OUTPATIENT
Start: 2023-11-05 | End: 2023-11-06 | Stop reason: HOSPADM

## 2023-11-05 RX ORDER — ACETAMINOPHEN 650 MG/1
650 SUPPOSITORY RECTAL EVERY 6 HOURS PRN
Status: DISCONTINUED | OUTPATIENT
Start: 2023-11-05 | End: 2023-11-06 | Stop reason: HOSPADM

## 2023-11-05 RX ORDER — EZETIMIBE 10 MG/1
10 TABLET ORAL DAILY
Status: DISCONTINUED | OUTPATIENT
Start: 2023-11-05 | End: 2023-11-05 | Stop reason: SDUPTHER

## 2023-11-05 RX ORDER — ACETAMINOPHEN 500 MG
1000 TABLET ORAL ONCE
Status: COMPLETED | OUTPATIENT
Start: 2023-11-05 | End: 2023-11-05

## 2023-11-05 RX ORDER — SODIUM CHLORIDE 0.9 % (FLUSH) 0.9 %
5-40 SYRINGE (ML) INJECTION EVERY 12 HOURS SCHEDULED
Status: DISCONTINUED | OUTPATIENT
Start: 2023-11-05 | End: 2023-11-06 | Stop reason: HOSPADM

## 2023-11-05 RX ORDER — EZETIMIBE 10 MG/1
10 TABLET ORAL NIGHTLY
Status: DISCONTINUED | OUTPATIENT
Start: 2023-11-05 | End: 2023-11-06 | Stop reason: HOSPADM

## 2023-11-05 RX ORDER — ROSUVASTATIN CALCIUM 40 MG/1
40 TABLET, COATED ORAL NIGHTLY
Status: DISCONTINUED | OUTPATIENT
Start: 2023-11-05 | End: 2023-11-06 | Stop reason: HOSPADM

## 2023-11-05 RX ORDER — ENOXAPARIN SODIUM 100 MG/ML
40 INJECTION SUBCUTANEOUS
Status: DISCONTINUED | OUTPATIENT
Start: 2023-11-05 | End: 2023-11-06 | Stop reason: HOSPADM

## 2023-11-05 RX ORDER — ONDANSETRON 4 MG/1
4 TABLET, ORALLY DISINTEGRATING ORAL EVERY 8 HOURS PRN
Status: DISCONTINUED | OUTPATIENT
Start: 2023-11-05 | End: 2023-11-06 | Stop reason: HOSPADM

## 2023-11-05 RX ORDER — SODIUM CHLORIDE, SODIUM LACTATE, POTASSIUM CHLORIDE, AND CALCIUM CHLORIDE .6; .31; .03; .02 G/100ML; G/100ML; G/100ML; G/100ML
500 INJECTION, SOLUTION INTRAVENOUS ONCE
Status: COMPLETED | OUTPATIENT
Start: 2023-11-05 | End: 2023-11-05

## 2023-11-05 RX ORDER — SODIUM CHLORIDE 9 MG/ML
INJECTION, SOLUTION INTRAVENOUS CONTINUOUS
Status: DISCONTINUED | OUTPATIENT
Start: 2023-11-05 | End: 2023-11-06 | Stop reason: HOSPADM

## 2023-11-05 RX ORDER — ONDANSETRON 2 MG/ML
4 INJECTION INTRAMUSCULAR; INTRAVENOUS EVERY 6 HOURS PRN
Status: DISCONTINUED | OUTPATIENT
Start: 2023-11-05 | End: 2023-11-06 | Stop reason: HOSPADM

## 2023-11-05 RX ORDER — ASPIRIN 81 MG/1
81 TABLET ORAL NIGHTLY
Status: DISCONTINUED | OUTPATIENT
Start: 2023-11-05 | End: 2023-11-06 | Stop reason: HOSPADM

## 2023-11-05 RX ORDER — MAGNESIUM SULFATE IN WATER 40 MG/ML
2000 INJECTION, SOLUTION INTRAVENOUS PRN
Status: DISCONTINUED | OUTPATIENT
Start: 2023-11-05 | End: 2023-11-06 | Stop reason: HOSPADM

## 2023-11-05 RX ORDER — POTASSIUM CHLORIDE 7.45 MG/ML
10 INJECTION INTRAVENOUS PRN
Status: DISCONTINUED | OUTPATIENT
Start: 2023-11-05 | End: 2023-11-06 | Stop reason: HOSPADM

## 2023-11-05 RX ORDER — ACETAMINOPHEN 325 MG/1
650 TABLET ORAL EVERY 6 HOURS PRN
Status: DISCONTINUED | OUTPATIENT
Start: 2023-11-05 | End: 2023-11-06 | Stop reason: HOSPADM

## 2023-11-05 RX ORDER — SODIUM CHLORIDE 9 MG/ML
INJECTION, SOLUTION INTRAVENOUS PRN
Status: DISCONTINUED | OUTPATIENT
Start: 2023-11-05 | End: 2023-11-06 | Stop reason: HOSPADM

## 2023-11-05 RX ORDER — BUPROPION HYDROCHLORIDE 150 MG/1
150 TABLET ORAL EVERY MORNING
COMMUNITY

## 2023-11-05 RX ORDER — POTASSIUM CHLORIDE 20 MEQ/1
40 TABLET, EXTENDED RELEASE ORAL PRN
Status: DISCONTINUED | OUTPATIENT
Start: 2023-11-05 | End: 2023-11-06 | Stop reason: HOSPADM

## 2023-11-05 RX ORDER — ASPIRIN 81 MG/1
81 TABLET ORAL DAILY
Status: DISCONTINUED | OUTPATIENT
Start: 2023-11-05 | End: 2023-11-05 | Stop reason: SDUPTHER

## 2023-11-05 RX ORDER — ROSUVASTATIN CALCIUM 40 MG/1
40 TABLET, COATED ORAL DAILY
Status: DISCONTINUED | OUTPATIENT
Start: 2023-11-05 | End: 2023-11-05 | Stop reason: SDUPTHER

## 2023-11-05 RX ORDER — POLYETHYLENE GLYCOL 3350 17 G/17G
17 POWDER, FOR SOLUTION ORAL DAILY PRN
Status: DISCONTINUED | OUTPATIENT
Start: 2023-11-05 | End: 2023-11-06 | Stop reason: HOSPADM

## 2023-11-05 RX ADMIN — EZETIMIBE 10 MG: 10 TABLET ORAL at 20:18

## 2023-11-05 RX ADMIN — ROSUVASTATIN CALCIUM 40 MG: 40 TABLET, FILM COATED ORAL at 20:18

## 2023-11-05 RX ADMIN — ACETAMINOPHEN 1000 MG: 500 TABLET ORAL at 08:46

## 2023-11-05 RX ADMIN — Medication 12.5 MG: at 09:00

## 2023-11-05 RX ADMIN — Medication: at 08:47

## 2023-11-05 RX ADMIN — IOPAMIDOL 75 ML: 755 INJECTION, SOLUTION INTRAVENOUS at 10:34

## 2023-11-05 RX ADMIN — SODIUM CHLORIDE: 9 INJECTION, SOLUTION INTRAVENOUS at 14:54

## 2023-11-05 RX ADMIN — ACETAMINOPHEN 650 MG: 325 TABLET ORAL at 16:47

## 2023-11-05 RX ADMIN — SODIUM CHLORIDE, POTASSIUM CHLORIDE, SODIUM LACTATE AND CALCIUM CHLORIDE 500 ML: 600; 310; 30; 20 INJECTION, SOLUTION INTRAVENOUS at 09:44

## 2023-11-05 RX ADMIN — Medication 10 ML: at 20:19

## 2023-11-05 RX ADMIN — ONDANSETRON 4 MG: 2 INJECTION INTRAMUSCULAR; INTRAVENOUS at 16:48

## 2023-11-05 RX ADMIN — ASPIRIN 81 MG: 81 TABLET, COATED ORAL at 20:18

## 2023-11-05 RX ADMIN — SODIUM CHLORIDE, POTASSIUM CHLORIDE, SODIUM LACTATE AND CALCIUM CHLORIDE 500 ML: 600; 310; 30; 20 INJECTION, SOLUTION INTRAVENOUS at 08:48

## 2023-11-05 ASSESSMENT — PAIN DESCRIPTION - DESCRIPTORS: DESCRIPTORS: ACHING

## 2023-11-05 ASSESSMENT — PAIN SCALES - GENERAL
PAINLEVEL_OUTOF10: 3
PAINLEVEL_OUTOF10: 0

## 2023-11-05 ASSESSMENT — PAIN - FUNCTIONAL ASSESSMENT
PAIN_FUNCTIONAL_ASSESSMENT: NONE - DENIES PAIN
PAIN_FUNCTIONAL_ASSESSMENT: ACTIVITIES ARE NOT PREVENTED

## 2023-11-05 ASSESSMENT — PAIN DESCRIPTION - LOCATION: LOCATION: HEAD

## 2023-11-05 ASSESSMENT — LIFESTYLE VARIABLES
HOW OFTEN DO YOU HAVE A DRINK CONTAINING ALCOHOL: NEVER
HOW MANY STANDARD DRINKS CONTAINING ALCOHOL DO YOU HAVE ON A TYPICAL DAY: PATIENT DOES NOT DRINK

## 2023-11-05 NOTE — ED PROVIDER NOTES
[facility] 4W MED SURG    CHIEF COMPLAINT  Illness (Pt states \" sick for 6 days pcp called in a z pack and zofran\"  abdomen pain, and chest tightness, nausea and headache )       HISTORY OF PRESENT ILLNESS  Laura Sexton is a 45 y.o. female with history of hyperlipidemia, CAD, MI, stent x3 who presents to the ED with headache, epigastric pain, chest discomfort, nausea. Symptoms have been ongoing for the past week. Started with a headache and nausea and then she developed epigastric/chest discomfort. Feels that the chest pain radiates to her shoulder/back. She has chronic leg swelling from lymphedema that is unchanged. Denies calf tenderness or asymmetry. Denies fever. PCP called in a Z-Camacho and Zofran and her symptoms have not improved. Feels fatigued and exertion seems to make her feel worse overall. Took a home COVID test and it was negative. States she has nausea but no vomiting, diarrhea, constipation. I have reviewed the following from the nursing documentation:    Past Medical History:   Diagnosis Date    CAD (coronary artery disease)     Hyperlipidemia     MI (myocardial infarction) [facility])      Past Surgical History:   Procedure Laterality Date    CERVICAL FUSION  11/2018    HYSTERECTOMY (CERVIX STATUS UNKNOWN)      TONSILLECTOMY       No family history on file.   Social History     Socioeconomic History    Marital status:      Spouse name: Not on file    Number of children: Not on file    Years of education: Not on file    Highest education level: Not on file   Occupational History    Not on file   Tobacco Use    Smoking status: Former    Smokeless tobacco: Never   Vaping Use    Vaping Use: Not on file   Substance and Sexual Activity    Alcohol use: Yes    Drug use: Never    Sexual activity: Not on file   Other Topics Concern    Not on file   Social History Narrative    Not on file     Social Determinants of Health     Financial Resource Strain: Not on file   Food Insecurity: No Food Insecurity

## 2023-11-05 NOTE — ED NOTES
Report called to  Jose E Jones        RN   To Room   3468  Cardiac monitor on during transfer  Pt's pain level   denies   VSS, Afebrile   IV site is clean, dry and intact, Normal saline locked   Family updated on transfer          Fatoumata Wilson RN  11/05/23 3154

## 2023-11-05 NOTE — ED NOTES
Pt resting in bed at this time, laying in a supine position with head of bed elevated . Call light remains in reach instructed pt how to use, and encouraged pt to call if needed assistance, no distress noted. RR even and unlabored, skin warm and dry. No needs at this time. Will continue to monitor closely.        Sofiya Jennings RN  11/05/23 1672

## 2023-11-05 NOTE — H&P
Hospital Medicine  History and Physical    Patient:  Ariel Duvall  MRN: [id number]    CHIEF COMPLAINT:    Chief Complaint   Patient presents with    Illness     Pt states \" sick for 6 days pcp called in a z pack and zofran\"  abdomen pain, and chest tightness, nausea and headache        History Obtained From:  patient, electronic medical record  Primary Care Physician: Sharif Brody MD    HISTORY OF PRESENT ILLNESS:   The patient is a 45 y.o. female with a history of CAD s/p stent who presents with substernal chest pain. Headaches, nausea, and fatigue over the past week with development of substernal chest pain over the past two days that radiates to the shoulder and back. Non-specific T-wave inversions on ECG. Last stress test was about two years ago and last coronary angiogram about five years ago. Troponin negative x 2. Moderate risk for adverse cardiac event with HEART score of 5. She has chronic leg swelling from lymphedema that is unchanged. Nausea relieved with Phenergan. Has been taking Zofran since Friday with minimal relief. No emesis. Quit smoking many years ago. No asthma. Past Medical History:      Diagnosis Date    CAD (coronary artery disease)     Hyperlipidemia     MI (myocardial infarction) [facility])        Past Surgical History:      Procedure Laterality Date    CERVICAL FUSION  11/2018    HYSTERECTOMY (CERVIX STATUS UNKNOWN)      TONSILLECTOMY         Medications Prior to Admission:    Prior to Admission medications    Medication Sig Start Date End Date Taking?  Authorizing Provider   ezetimibe (ZETIA) 10 MG tablet Take 1 tablet by mouth daily 9/29/23   Zuleyma Mcdonnell MD   rosuvastatin (CRESTOR) 40 MG tablet Take 1 tablet by mouth daily 3/23/23   Zuleyma Mcdonnell MD   Elastic Bandages & Supports ([address] ) Jose Manuel Young 1 each by Does not apply route daily 8/19/21   Zuleyma Mcdonnell MD   zolpidem (AMBIEN) 10 MG tablet Take 5 mg by mouth nightly

## 2023-11-05 NOTE — ED NOTES
Patient ambulated to and from the restroom in room 8 without incident.      Blair Brewer RN  11/05/23 0604

## 2023-11-05 NOTE — ED NOTES
Patient's spouse states that when the patient had her heart attack, it took 3 blood test before it showed. Patient resting without complaints. No signs of distress noted. Call light in reach.      Ruby Ghotra RN  11/05/23 8146

## 2023-11-06 VITALS
WEIGHT: 165.34 LBS | HEART RATE: 69 BPM | OXYGEN SATURATION: 99 % | TEMPERATURE: 98.1 F | RESPIRATION RATE: 15 BRPM | HEIGHT: 68 IN | SYSTOLIC BLOOD PRESSURE: 112 MMHG | BODY MASS INDEX: 25.06 KG/M2 | DIASTOLIC BLOOD PRESSURE: 73 MMHG

## 2023-11-06 LAB
ALBUMIN SERPL-MCNC: 3.8 G/DL (ref 3.4–5)
ALBUMIN/GLOB SERPL: 2.1 {RATIO} (ref 1.1–2.2)
ALP SERPL-CCNC: 73 U/L (ref 40–129)
ALT SERPL-CCNC: 71 U/L (ref 10–40)
ANION GAP SERPL CALCULATED.3IONS-SCNC: 8 MMOL/L (ref 3–16)
AST SERPL-CCNC: 58 U/L (ref 15–37)
BILIRUB SERPL-MCNC: 0.4 MG/DL (ref 0–1)
BUN SERPL-MCNC: 11 MG/DL (ref 7–20)
CALCIUM SERPL-MCNC: 8.7 MG/DL (ref 8.3–10.6)
CHLORIDE SERPL-SCNC: 109 MMOL/L (ref 99–110)
CHOLEST SERPL-MCNC: 94 MG/DL (ref 0–199)
CO2 SERPL-SCNC: 26 MMOL/L (ref 21–32)
CREAT SERPL-MCNC: 1.5 MG/DL (ref 0.6–1.1)
DEPRECATED RDW RBC AUTO: 12.5 % (ref 12.4–15.4)
GFR SERPLBLD CREATININE-BSD FMLA CKD-EPI: 41 ML/MIN/{1.73_M2}
GLUCOSE SERPL-MCNC: 105 MG/DL (ref 70–99)
HCT VFR BLD AUTO: 32.3 % (ref 36–48)
HDLC SERPL-MCNC: 37 MG/DL (ref 40–60)
HGB BLD-MCNC: 11.5 G/DL (ref 12–16)
LDLC SERPL CALC-MCNC: 38 MG/DL
MAGNESIUM SERPL-MCNC: 2.2 MG/DL (ref 1.8–2.4)
MCH RBC QN AUTO: 29 PG (ref 26–34)
MCHC RBC AUTO-ENTMCNC: 35.5 G/DL (ref 31–36)
MCV RBC AUTO: 81.7 FL (ref 80–100)
PLATELET # BLD AUTO: 205 K/UL (ref 135–450)
PMV BLD AUTO: 8.9 FL (ref 5–10.5)
POTASSIUM SERPL-SCNC: 3.5 MMOL/L (ref 3.5–5.1)
PROT SERPL-MCNC: 5.6 G/DL (ref 6.4–8.2)
RBC # BLD AUTO: 3.95 M/UL (ref 4–5.2)
SODIUM SERPL-SCNC: 143 MMOL/L (ref 136–145)
TRIGL SERPL-MCNC: 95 MG/DL (ref 0–150)
TROPONIN, HIGH SENSITIVITY: 19 NG/L (ref 0–14)
VLDLC SERPL CALC-MCNC: 19 MG/DL
WBC # BLD AUTO: 6.3 K/UL (ref 4–11)

## 2023-11-06 PROCEDURE — 84484 ASSAY OF TROPONIN QUANT: CPT

## 2023-11-06 PROCEDURE — 2580000003 HC RX 258: Performed by: INTERNAL MEDICINE

## 2023-11-06 PROCEDURE — 96361 HYDRATE IV INFUSION ADD-ON: CPT

## 2023-11-06 PROCEDURE — 83735 ASSAY OF MAGNESIUM: CPT

## 2023-11-06 PROCEDURE — A9502 TC99M TETROFOSMIN: HCPCS | Performed by: INTERNAL MEDICINE

## 2023-11-06 PROCEDURE — 93017 CV STRESS TEST TRACING ONLY: CPT

## 2023-11-06 PROCEDURE — 78452 HT MUSCLE IMAGE SPECT MULT: CPT

## 2023-11-06 PROCEDURE — G0378 HOSPITAL OBSERVATION PER HR: HCPCS

## 2023-11-06 PROCEDURE — 3430000000 HC RX DIAGNOSTIC RADIOPHARMACEUTICAL: Performed by: INTERNAL MEDICINE

## 2023-11-06 PROCEDURE — 80053 COMPREHEN METABOLIC PANEL: CPT

## 2023-11-06 PROCEDURE — 36415 COLL VENOUS BLD VENIPUNCTURE: CPT

## 2023-11-06 PROCEDURE — 99203 OFFICE O/P NEW LOW 30 MIN: CPT | Performed by: INTERNAL MEDICINE

## 2023-11-06 PROCEDURE — 94760 N-INVAS EAR/PLS OXIMETRY 1: CPT

## 2023-11-06 PROCEDURE — 85027 COMPLETE CBC AUTOMATED: CPT

## 2023-11-06 PROCEDURE — 80061 LIPID PANEL: CPT

## 2023-11-06 RX ORDER — ZOLPIDEM TARTRATE 12.5 MG/1
12.5 TABLET, FILM COATED, EXTENDED RELEASE ORAL NIGHTLY PRN
COMMUNITY

## 2023-11-06 RX ADMIN — TETROFOSMIN 10 MILLICURIE: 1.38 INJECTION, POWDER, LYOPHILIZED, FOR SOLUTION INTRAVENOUS at 06:46

## 2023-11-06 RX ADMIN — TETROFOSMIN 30 MILLICURIE: 1.38 INJECTION, POWDER, LYOPHILIZED, FOR SOLUTION INTRAVENOUS at 09:15

## 2023-11-06 RX ADMIN — SODIUM CHLORIDE: 9 INJECTION, SOLUTION INTRAVENOUS at 00:58

## 2023-11-06 NOTE — PLAN OF CARE
Problem: Discharge Planning  Goal: Discharge to home or other facility with appropriate resources  11/6/2023 1435 by Abdirahman Armas RN  Outcome: Completed  11/6/2023 0816 by Abdirahman Armas RN  Outcome: Progressing     Problem: Safety - Adult  Goal: Free from fall injury  11/6/2023 1435 by Abdirahman Armas RN  Outcome: Completed  11/6/2023 0816 by Abdirahman Armas RN  Outcome: Progressing     Problem: ABCDS Injury Assessment  Goal: Absence of physical injury  11/6/2023 1435 by Abdirahman Armas RN  Outcome: Completed  11/6/2023 0816 by Abdirahman Armas RN  Outcome: Progressing     Problem: Pain  Goal: Verbalizes/displays adequate comfort level or baseline comfort level  11/6/2023 1435 by Abdirahman Armas RN  Outcome: Completed  11/6/2023 0816 by Abdirahman Armas RN  Outcome: Progressing

## 2023-11-06 NOTE — PROGRESS NOTES
Reviewed discharge and follow up information with patient and , answered all questions. Patient without s/s of distress at discharge, ambulatory, refused wheelchair transport to lobby.  to transport patient home.

## 2023-11-06 NOTE — CONSULTS
due to failure to reach target heart rate. No ischemic ECG changes at level of heart rate achieved by Lexiscan infusion    alone (83% of MPHR)        Overall findings represent a low risk study. Assessment / Plan:    1. Chest pain, atypical  Valentina's chest pain was really referred from the abdomen. She has had no symptoms of her typical angina. In addition her EKG is unremarkable and her troponin assays are as well. She did undergo a treadmill stress test this morning which was essentially normal.  I would not pursue this further. 2.  CAD of native coronary arteries without angina  Continue aspirin. No symptoms of angina. 3.  Hyperlipidemia with goal LDL less than 70 mg/dL   Continue rosuvastatin and Zetia. Her lipid profile is quite well controlled. Hanny Valdez can follow-up with me in the office for her regular 6-month appointment. I do not need to see her sooner than that unless she has issues. I will sign off for now. Thank you very much for allowing me to participate in the care of your patient.

## 2023-11-06 NOTE — DISCHARGE INSTR - DIET
Good nutrition is important when healing from an illness, injury, or surgery. Follow any nutrition recommendations given to you during your hospital stay. If you were given an oral nutrition supplement while in the hospital, continue to take this supplement at home. You can take it with meals, in-between meals, and/or before bedtime. These supplements can be purchased at most local grocery stores, pharmacies, and chain [employer]-stores. If you have any questions about your diet or nutrition, call the hospital and ask for the dietitian.     Low fat, low cholesterol, low sodium, high fiber diet

## 2023-11-06 NOTE — PROGRESS NOTES
Patient alert and oriented x4, VSS, sitting up in bed. Patient ambulated to the bathroom and back into bed without issue, tolerated well. Patient denies chest pain at this time, denies n/v, diarrhea, SOB, and other pain as well. BLE continue to have edema, encouraged patient to elevate when able. Patient states no further needs. Bed in lowest and locked position. Patient with non-slip socks on, call light in reach, calls appropriately.

## 2023-11-06 NOTE — PLAN OF CARE
Problem: Discharge Planning  Goal: Discharge to home or other facility with appropriate resources  11/6/2023 0816 by Alley Thornton RN  Outcome: Progressing  11/5/2023 2220 by Wily Thomas RN  Outcome: Progressing     Problem: Safety - Adult  Goal: Free from fall injury  11/6/2023 0816 by Alley Thornton RN  Outcome: Progressing  11/5/2023 2220 by Wily Thomas RN  Outcome: Progressing     Problem: ABCDS Injury Assessment  Goal: Absence of physical injury  11/6/2023 0816 by Alley Thornton RN  Outcome: Progressing  11/5/2023 2220 by Wily Thomas RN  Outcome: Progressing     Problem: Pain  Goal: Verbalizes/displays adequate comfort level or baseline comfort level  11/6/2023 0816 by Alley Thornton RN  Outcome: Progressing  11/5/2023 2220 by Wily Thomas RN  Outcome: Progressing

## 2023-11-06 NOTE — CARE COORDINATION
Discharge Planning:      (CM) reviewed the patient's chart to assess needs. Patient's Readmission Risk Score is not determined as pt is in observation status  . Patient's medical insurance is  Payor: BCBS / Plan: BCBS - [place] PPO / Product Type: *No Product type* / .  Patient's PCP is Rylan Bragg MD .  No needs anticipated, at this time. CM team to follow. Staff to inform CM if additional discharge needs arise. Pts preferred pharmacy is   [id number] [place], [address] [place] [address]. - P [phone] - F [phone]  [address] [facility] [place] [facility]  Phone: [phone] Fax: [phone]    [address], [address] [phone] Hulan Runner [phone] [address]  Phone: [phone] Fax: [phone] [address] [place] [address],[address] [phone] - F [phone]  [address] [place] [address] Drive [address]  Phone: [phone] Fax: [phone]    Please consult SW/CM if a d/c need arises.    TEMITOPE Nassar  [phone]  Electronically signed by TEMITOPE Boo on 11/6/2023 at 7:49 AM

## 2024-03-04 DIAGNOSIS — E78.5 HYPERLIPIDEMIA LDL GOAL <70: ICD-10-CM

## 2024-03-05 RX ORDER — ROSUVASTATIN CALCIUM 40 MG/1
40 TABLET, COATED ORAL DAILY
Qty: 90 TABLET | Refills: 3 | Status: SHIPPED | OUTPATIENT
Start: 2024-03-05

## 2024-03-05 NOTE — TELEPHONE ENCOUNTER
Requested Prescriptions     Pending Prescriptions Disp Refills    rosuvastatin (CRESTOR) 40 MG tablet [Pharmacy Med Name: Rosuvastatin Calcium 40 MG Oral Tablet] 90 tablet 3     Sig: TAKE 1 TABLET BY MOUTH DAILY          Number: 90    Refills: 3    Last Office Visit: 3/23/2023     Next Office Visit: None scheduled     Last Refill: 03/23/2023    Last Labs:  11/06/2023

## 2024-04-05 NOTE — PROGRESS NOTES
normal heart sounds and intact distal pulses.  Exam reveals no gallop and no friction rub.  No murmur heard.  Pulmonary/Chest: Effort normal and breath sounds normal. No respiratory distress. She has no wheezes, rhonchi or rales.   Abdominal: Soft, non-tender. Bowel sounds and aorta are normal. She exhibits no organomegaly, mass or bruit.   Extremities: Bilateral Lower extremity lymphedema, No cyanosis, or clubbing. Pulses are 2+ radial/carotid/dorsalis pedis and posterior tibial bilaterally. Bilateral lower extremity lymphedema   Neurological: She is alert and oriented to person, place, and time. She has normal reflexes. No cranial nerve deficit. Coordination normal.   Skin: Skin is warm and dry. There is no rash or diaphoresis.   Psychiatric: She has a normal mood and affect. Her speech is normal and behavior is normal.     Personally reviewed and interpreted   EKG Interpretation 5/17/19: Sinus rhythm with prior inferior infarct  EKG Interpretation 1/29/20: Sinus Rhythm, Low voltage in precordial leads, nonspecific T-abnormality.   EKG Interpretation 2/17/22: Sinus rhythm, Low voltage in precordial leads, nonspecific T-abnormality.   EKG Interpretation 3/23/23: Sinus rhythm     Procedures:     [facility] 4/30/19  1.  Right-dominant coronary arterial circulation with 100% occlusion of  the proximal RCA.  This was covered with overlapping stents.  The more  proximal stent was a 3 mm x 23-mm Yasmin drug-eluting stent dilated to  3.1.  Distal to this and overlapping was a 2.75 x 12-mm Yasmin  drug-eluting stent.  In the left main, there was no significant disease.  The circumflex had a 99% proximal lesion that was intervened upon with a  2.75 x 12-mm Yasmin drug-eluting stent dilated to 2.75 mm.  In the left  anterior descending artery, there is a focal 60% lesion present with no  flow limitation.  2.  Preserved left ventricular systolic function.  LV ejection fraction  50% but basal to mid-inferior wall hypokinesis.  3.

## 2024-04-18 ENCOUNTER — OFFICE VISIT (OUTPATIENT)
Dept: CARDIOLOGY CLINIC | Age: 54
End: 2024-04-18
Payer: COMMERCIAL

## 2024-04-18 VITALS
BODY MASS INDEX: 25.15 KG/M2 | OXYGEN SATURATION: 95 % | HEIGHT: 68 IN | HEART RATE: 75 BPM | SYSTOLIC BLOOD PRESSURE: 118 MMHG | DIASTOLIC BLOOD PRESSURE: 90 MMHG

## 2024-04-18 DIAGNOSIS — E78.5 HYPERLIPIDEMIA LDL GOAL <70: ICD-10-CM

## 2024-04-18 DIAGNOSIS — I25.10 CORONARY ARTERY DISEASE INVOLVING NATIVE CORONARY ARTERY OF NATIVE HEART WITHOUT ANGINA PECTORIS: Primary | ICD-10-CM

## 2024-04-18 PROCEDURE — 99214 OFFICE O/P EST MOD 30 MIN: CPT | Performed by: INTERNAL MEDICINE

## 2025-01-18 DIAGNOSIS — E78.5 HYPERLIPIDEMIA LDL GOAL <70: ICD-10-CM

## 2025-01-20 RX ORDER — ROSUVASTATIN CALCIUM 40 MG/1
40 TABLET, COATED ORAL DAILY
Qty: 90 TABLET | Refills: 3 | Status: SHIPPED | OUTPATIENT
Start: 2025-01-20

## 2025-03-23 ENCOUNTER — HOSPITAL ENCOUNTER (EMERGENCY)
Age: 55
Discharge: HOME | End: 2025-03-23
Attending: EMERGENCY MEDICINE
Payer: COMMERCIAL

## 2025-03-23 ENCOUNTER — APPOINTMENT (OUTPATIENT)
Dept: CT IMAGING | Age: 55
End: 2025-03-23
Payer: COMMERCIAL

## 2025-03-23 ENCOUNTER — APPOINTMENT (OUTPATIENT)
Dept: GENERAL RADIOLOGY | Age: 55
End: 2025-03-23
Payer: COMMERCIAL

## 2025-03-23 VITALS
DIASTOLIC BLOOD PRESSURE: 95 MMHG | RESPIRATION RATE: 16 BRPM | TEMPERATURE: 98 F | OXYGEN SATURATION: 98 % | HEIGHT: 68 IN | BODY MASS INDEX: 25.39 KG/M2 | HEART RATE: 65 BPM | WEIGHT: 167.55 LBS | SYSTOLIC BLOOD PRESSURE: 163 MMHG

## 2025-03-23 DIAGNOSIS — B02.9 HERPES ZOSTER WITHOUT COMPLICATION: Primary | ICD-10-CM

## 2025-03-23 LAB
ALBUMIN SERPL-MCNC: 4.8 G/DL (ref 3.4–5)
ALBUMIN/GLOB SERPL: 1.9 {RATIO} (ref 1.1–2.2)
ALP SERPL-CCNC: 81 U/L (ref 40–129)
ALT SERPL-CCNC: 27 U/L (ref 10–40)
ANION GAP SERPL CALCULATED.3IONS-SCNC: 11 MMOL/L (ref 3–16)
AST SERPL-CCNC: 25 U/L (ref 15–37)
BACTERIA URNS QL MICRO: ABNORMAL /HPF
BASOPHILS # BLD: 0.1 K/UL (ref 0–0.2)
BASOPHILS NFR BLD: 1.2 %
BILIRUB SERPL-MCNC: 0.7 MG/DL (ref 0–1)
BILIRUB UR QL STRIP.AUTO: NEGATIVE
BUN SERPL-MCNC: 23 MG/DL (ref 7–20)
CALCIUM SERPL-MCNC: 10.1 MG/DL (ref 8.3–10.6)
CHLORIDE SERPL-SCNC: 106 MMOL/L (ref 99–110)
CLARITY UR: CLEAR
CO2 SERPL-SCNC: 23 MMOL/L (ref 21–32)
COLOR UR: YELLOW
CREAT SERPL-MCNC: 1.6 MG/DL (ref 0.6–1.1)
DEPRECATED RDW RBC AUTO: 12.9 % (ref 12.4–15.4)
EOSINOPHIL # BLD: 0.1 K/UL (ref 0–0.6)
EOSINOPHIL NFR BLD: 1.2 %
EPI CELLS #/AREA URNS AUTO: 2 /HPF (ref 0–5)
GFR SERPLBLD CREATININE-BSD FMLA CKD-EPI: 38 ML/MIN/{1.73_M2}
GLUCOSE SERPL-MCNC: 90 MG/DL (ref 70–99)
GLUCOSE UR STRIP.AUTO-MCNC: NEGATIVE MG/DL
HCT VFR BLD AUTO: 34.5 % (ref 36–48)
HGB BLD-MCNC: 11.8 G/DL (ref 12–16)
HGB UR QL STRIP.AUTO: ABNORMAL
HYALINE CASTS #/AREA URNS AUTO: 0 /LPF (ref 0–8)
INR PPP: 0.93 (ref 0.85–1.15)
KETONES UR STRIP.AUTO-MCNC: NEGATIVE MG/DL
LEUKOCYTE ESTERASE UR QL STRIP.AUTO: NEGATIVE
LIPASE SERPL-CCNC: 46 U/L (ref 13–60)
LYMPHOCYTES # BLD: 2.3 K/UL (ref 1–5.1)
LYMPHOCYTES NFR BLD: 26.9 %
MAGNESIUM SERPL-MCNC: 2.4 MG/DL (ref 1.8–2.4)
MCH RBC QN AUTO: 29.6 PG (ref 26–34)
MCHC RBC AUTO-ENTMCNC: 34.1 G/DL (ref 31–36)
MCV RBC AUTO: 87 FL (ref 80–100)
MONOCYTES # BLD: 0.6 K/UL (ref 0–1.3)
MONOCYTES NFR BLD: 7 %
NEUTROPHILS # BLD: 5.4 K/UL (ref 1.7–7.7)
NEUTROPHILS NFR BLD: 63.7 %
NITRITE UR QL STRIP.AUTO: NEGATIVE
PH UR STRIP.AUTO: 6.5 [PH] (ref 5–8)
PLATELET # BLD AUTO: 253 K/UL (ref 135–450)
PMV BLD AUTO: 8.6 FL (ref 5–10.5)
POTASSIUM SERPL-SCNC: 3.8 MMOL/L (ref 3.5–5.1)
PROT SERPL-MCNC: 7.3 G/DL (ref 6.4–8.2)
PROT UR STRIP.AUTO-MCNC: 30 MG/DL
PROTHROMBIN TIME: 12.7 SEC (ref 11.9–14.9)
RBC # BLD AUTO: 3.97 M/UL (ref 4–5.2)
RBC CLUMPS #/AREA URNS AUTO: 3 /HPF (ref 0–4)
SODIUM SERPL-SCNC: 140 MMOL/L (ref 136–145)
SP GR UR STRIP.AUTO: 1.01 (ref 1–1.03)
TROPONIN, HIGH SENSITIVITY: 11 NG/L (ref 0–14)
TROPONIN, HIGH SENSITIVITY: 17 NG/L (ref 0–14)
UA DIPSTICK W REFLEX MICRO PNL UR: YES
URN SPEC COLLECT METH UR: ABNORMAL
UROBILINOGEN UR STRIP-ACNC: 0.2 E.U./DL
WBC # BLD AUTO: 8.5 K/UL (ref 4–11)
WBC #/AREA URNS AUTO: 2 /HPF (ref 0–5)

## 2025-03-23 PROCEDURE — 74177 CT ABD & PELVIS W/CONTRAST: CPT

## 2025-03-23 PROCEDURE — 36415 COLL VENOUS BLD VENIPUNCTURE: CPT

## 2025-03-23 PROCEDURE — 71046 X-RAY EXAM CHEST 2 VIEWS: CPT

## 2025-03-23 PROCEDURE — 81001 URINALYSIS AUTO W/SCOPE: CPT

## 2025-03-23 PROCEDURE — 6360000004 HC RX CONTRAST MEDICATION: Performed by: PHYSICIAN ASSISTANT

## 2025-03-23 PROCEDURE — 85025 COMPLETE CBC W/AUTO DIFF WBC: CPT

## 2025-03-23 PROCEDURE — 83690 ASSAY OF LIPASE: CPT

## 2025-03-23 PROCEDURE — 85610 PROTHROMBIN TIME: CPT

## 2025-03-23 PROCEDURE — 84484 ASSAY OF TROPONIN QUANT: CPT

## 2025-03-23 PROCEDURE — 83735 ASSAY OF MAGNESIUM: CPT

## 2025-03-23 PROCEDURE — 93005 ELECTROCARDIOGRAM TRACING: CPT | Performed by: EMERGENCY MEDICINE

## 2025-03-23 PROCEDURE — 6370000000 HC RX 637 (ALT 250 FOR IP): Performed by: PHYSICIAN ASSISTANT

## 2025-03-23 PROCEDURE — 80053 COMPREHEN METABOLIC PANEL: CPT

## 2025-03-23 PROCEDURE — 99285 EMERGENCY DEPT VISIT HI MDM: CPT

## 2025-03-23 RX ORDER — OXYCODONE HYDROCHLORIDE 5 MG/1
5 TABLET ORAL EVERY 8 HOURS PRN
Qty: 7 TABLET | Refills: 0 | Status: SHIPPED | OUTPATIENT
Start: 2025-03-23 | End: 2025-03-30

## 2025-03-23 RX ORDER — ACETAMINOPHEN 325 MG/1
650 TABLET ORAL ONCE
Status: COMPLETED | OUTPATIENT
Start: 2025-03-23 | End: 2025-03-23

## 2025-03-23 RX ORDER — OXYCODONE HYDROCHLORIDE 5 MG/1
5 TABLET ORAL ONCE
Refills: 0 | Status: COMPLETED | OUTPATIENT
Start: 2025-03-23 | End: 2025-03-23

## 2025-03-23 RX ORDER — VALACYCLOVIR HYDROCHLORIDE 1 G/1
1000 TABLET, FILM COATED ORAL 3 TIMES DAILY
Qty: 21 TABLET | Refills: 0 | Status: SHIPPED | OUTPATIENT
Start: 2025-03-23 | End: 2025-03-30

## 2025-03-23 RX ORDER — IOPAMIDOL 755 MG/ML
75 INJECTION, SOLUTION INTRAVASCULAR
Status: COMPLETED | OUTPATIENT
Start: 2025-03-23 | End: 2025-03-23

## 2025-03-23 RX ORDER — LIDOCAINE 4 G/G
1-2 PATCH TOPICAL DAILY
Qty: 14 PATCH | Refills: 0 | Status: SHIPPED | OUTPATIENT
Start: 2025-03-23 | End: 2025-03-30

## 2025-03-23 RX ORDER — ACETAMINOPHEN 500 MG
1000 TABLET ORAL 3 TIMES DAILY
Qty: 42 TABLET | Refills: 0 | Status: SHIPPED | OUTPATIENT
Start: 2025-03-23 | End: 2025-03-30

## 2025-03-23 RX ORDER — MELOXICAM 7.5 MG/1
7.5 TABLET ORAL DAILY
Qty: 7 TABLET | Refills: 0 | Status: SHIPPED | OUTPATIENT
Start: 2025-03-23 | End: 2025-03-30

## 2025-03-23 RX ORDER — METHYLPREDNISOLONE 4 MG/1
TABLET ORAL
Qty: 1 KIT | Refills: 0 | Status: SHIPPED | OUTPATIENT
Start: 2025-03-23 | End: 2025-03-23 | Stop reason: ALTCHOICE

## 2025-03-23 RX ADMIN — OXYCODONE 5 MG: 5 TABLET ORAL at 18:34

## 2025-03-23 RX ADMIN — IOPAMIDOL 75 ML: 755 INJECTION, SOLUTION INTRAVENOUS at 17:03

## 2025-03-23 RX ADMIN — ACETAMINOPHEN 650 MG: 325 TABLET ORAL at 18:33

## 2025-03-23 ASSESSMENT — PAIN DESCRIPTION - ORIENTATION
ORIENTATION: RIGHT;MID
ORIENTATION: RIGHT;MID

## 2025-03-23 ASSESSMENT — PAIN SCALES - GENERAL
PAINLEVEL_OUTOF10: 3
PAINLEVEL_OUTOF10: 6

## 2025-03-23 ASSESSMENT — PAIN - FUNCTIONAL ASSESSMENT
PAIN_FUNCTIONAL_ASSESSMENT: 0-10
PAIN_FUNCTIONAL_ASSESSMENT: ACTIVITIES ARE NOT PREVENTED
PAIN_FUNCTIONAL_ASSESSMENT: ACTIVITIES ARE NOT PREVENTED

## 2025-03-23 ASSESSMENT — PAIN DESCRIPTION - LOCATION: LOCATION: BACK;ABDOMEN;RIB CAGE

## 2025-03-23 ASSESSMENT — PAIN DESCRIPTION - DESCRIPTORS: DESCRIPTORS: ACHING

## 2025-03-23 ASSESSMENT — PAIN DESCRIPTION - PAIN TYPE: TYPE: ACUTE PAIN

## 2025-03-23 NOTE — ED PROVIDER NOTES
[facility] EMERGENCY DEPARTMENT  EMERGENCY DEPARTMENT ENCOUNTER        Pt Name: Kindra Khan  MRN: [id number]  Birthdate 1970  Date of evaluation: 3/23/2025  Provider: Olga Juarez PA-C  PCP: Nehemiah Michelle MD  Note Started: 3:06 PM EDT 3/23/25       I have seen and evaluated this patient with my supervising physician Juan Pablo Ruiz MD.      CHIEF COMPLAINT       Chief Complaint   Patient presents with    Back Pain     Pt c/o rt sided mid level back pain that pt reports radiates through her body and to the front right rib/upper abd pain x 3 days. Denies urinary symptoms, N/V/D.        HISTORY OF PRESENT ILLNESS: 1 or more Elements     History From: Patient            Chief Complaint: Right-sided upper abdominal pain    Kindra Khan is a 54 y.o. female who presents to the emergency department with complaint of right-sided upper abdominal pain that is been present over the past several days followed by a rash.  She states that the pain radiates through her body from the right side to her back.  She states that nothing makes it better or worse.  She took her home medication, however no significant relief.  Denies any nausea vomiting or diarrhea.  She denies any pain in her chest or shortness of breath.  She does have a history of MI, however her symptoms are not consistent with her previous heart attack.  She does have a history of chickenpox as a  child.    Nursing Notes were all reviewed and agreed with or any disagreements were addressed in the HPI.    REVIEW OF SYSTEMS :      Review of Systems   All other systems reviewed and are negative.      Positives and Pertinent negatives as per HPI.     SURGICAL HISTORY     Past Surgical History:   Procedure Laterality Date    CERVICAL FUSION  11/2018    HYSTERECTOMY (CERVIX STATUS UNKNOWN)      TONSILLECTOMY         CURRENTMEDICATIONS       Discharge Medication List as of 3/23/2025  6:57 PM        CONTINUE these medications which have NOT

## 2025-03-23 NOTE — DISCHARGE INSTRUCTIONS
Your prescription has been sent to [facility].    Avoid over-the-counter NSAID medications (ex: Ibuprofen, Advil, Motrin, Naproxen, Aleve, etc.) for the next 7 days as they can interact with the medicine we are prescribing for you.    Avoid taking oxycodone within 6 hours of taking other potentially sedating medications and/or alcohol.    Be sure to contact your primary care provider's office to arrange for a follow up visit.    If you have any new or worsening issues after going home don't hesitate to return here for reevaluation at any time 24/7!

## 2025-03-23 NOTE — ED TRIAGE NOTES
Pt c/o rt sided mid level back pain that pt reports radiates through her body and to the front right rib/upper abd pain x 3 days. Denies urinary symptoms, N/V/D.

## 2025-03-23 NOTE — ED NOTES
Pt refusing EKG at this time, even after speaking with Avelina Juarez PA-C regarding results and plan of care, she states \"I just don't see paying for another EKG\". Avelina Juarez PA-C notified.

## 2025-03-24 LAB
EKG ATRIAL RATE: 67 BPM
EKG DIAGNOSIS: NORMAL
EKG P AXIS: 55 DEGREES
EKG P-R INTERVAL: 166 MS
EKG Q-T INTERVAL: 420 MS
EKG QRS DURATION: 100 MS
EKG QTC CALCULATION (BAZETT): 443 MS
EKG R AXIS: -15 DEGREES
EKG T AXIS: 25 DEGREES
EKG VENTRICULAR RATE: 67 BPM

## 2025-03-24 PROCEDURE — 93010 ELECTROCARDIOGRAM REPORT: CPT | Performed by: INTERNAL MEDICINE

## 2025-03-25 NOTE — ED PROVIDER NOTES
EMERGENCY DEPARTMENT SUPERVISING PHYSICIAN NOTE    I have seen this patient & have reviewed history and findings with the PA, NP, or resident physician and provided direct supervision. I saw the patient and performed a substantive portion of the visit. I was present for key portions of any procedures performed. I've participated in medical management, monitoring, and treatment of this patient with the provider. I have reviewed currently available documentation, test results, and laboratory results in the interim. Care plan has been developed collaboratively. I take responsibility for the patient's management from when I was asked to get involved in this patient's care. Olga and LUIS ANTONIO are the primary clinicians of record.    Brief HPI:  González 54F reports 72 hours of intermittent, intense sharp or shock-like pain to her right middle back, right side, and right upper abdomen    Pertinent Exam Findings:  Awake, alert, not acutely ill-appearing, not distressed  CTAB, RRR, 2+ radial pulses  Abdomen soft, NT, ND  No CVA TTP on either side  Ambulatory independently  Erythematous & vesicular rash to ~T5 or T6 distribution on her right side at the same region where she has been experiencing pain    Results for orders placed or performed during the hospital encounter of 03/23/25   CBC with Auto Differential   Result Value Ref Range    WBC 8.5 4.0 - 11.0 K/uL    RBC 3.97 (L) 4.00 - 5.20 M/uL    Hemoglobin 11.8 (L) 12.0 - 16.0 g/dL    Hematocrit 34.5 (L) 36.0 - 48.0 %    MCV 87.0 80.0 - 100.0 fL    MCH 29.6 26.0 - 34.0 pg    MCHC 34.1 31.0 - 36.0 g/dL    RDW 12.9 12.4 - 15.4 %    Platelets 253 135 - 450 K/uL    MPV 8.6 5.0 - 10.5 fL    Neutrophils % 63.7 %    Lymphocytes % 26.9 %    Monocytes % 7.0 %    Eosinophils % 1.2 %    Basophils % 1.2 %    Neutrophils Absolute 5.4 1.7 - 7.7 K/uL    Lymphocytes Absolute 2.3 1.0 - 5.1 K/uL    Monocytes Absolute 0.6 0.0 - 1.3 K/uL    Eosinophils Absolute 0.1 0.0 - 0.6 K/uL    Basophils